# Patient Record
Sex: FEMALE | Race: WHITE | Employment: OTHER | URBAN - METROPOLITAN AREA
[De-identification: names, ages, dates, MRNs, and addresses within clinical notes are randomized per-mention and may not be internally consistent; named-entity substitution may affect disease eponyms.]

---

## 2019-05-04 ENCOUNTER — APPOINTMENT (OUTPATIENT)
Dept: CT IMAGING | Age: 84
End: 2019-05-04
Payer: MEDICARE

## 2019-05-04 ENCOUNTER — HOSPITAL ENCOUNTER (EMERGENCY)
Age: 84
Discharge: HOME | End: 2019-05-04
Attending: EMERGENCY MEDICINE
Payer: MEDICARE

## 2019-05-04 VITALS
WEIGHT: 145 LBS | HEIGHT: 64 IN | RESPIRATION RATE: 16 BRPM | TEMPERATURE: 99 F | SYSTOLIC BLOOD PRESSURE: 153 MMHG | OXYGEN SATURATION: 97 % | BODY MASS INDEX: 24.75 KG/M2 | HEART RATE: 81 BPM | DIASTOLIC BLOOD PRESSURE: 73 MMHG

## 2019-05-04 DIAGNOSIS — S09.90XA INJURY OF HEAD, INITIAL ENCOUNTER: Primary | ICD-10-CM

## 2019-05-04 DIAGNOSIS — S01.01XA LACERATION OF SCALP, INITIAL ENCOUNTER: ICD-10-CM
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
## 2019-05-04 PROCEDURE — 70450 CT HEAD/BRAIN W/O DYE: CPT

## 2019-05-04 PROCEDURE — 99284 EMERGENCY DEPT VISIT MOD MDM: CPT

## 2019-05-04 PROCEDURE — 12001 RPR S/N/AX/GEN/TRNK 2.5CM/<: CPT

## 2019-05-04 PROCEDURE — 72125 CT NECK SPINE W/O DYE: CPT

## 2019-05-04 ASSESSMENT — ENCOUNTER SYMPTOMS
BACK PAIN: 0
VOMITING: 0
CHEST TIGHTNESS: 0
COUGH: 0
SHORTNESS OF BREATH: 0
NAUSEA: 0
BLOOD IN STOOL: 0
CONSTIPATION: 0
ABDOMINAL PAIN: 0
RHINORRHEA: 0
WHEEZING: 0
SORE THROAT: 0
DIARRHEA: 0

## 2019-05-04 NOTE — ED PROVIDER NOTES
well-developed and well-nourished. No distress. HENT:   Head: Normocephalic and atraumatic. Mouth/Throat: Oropharynx is clear and moist. No oropharyngeal exudate. Eyes: Pupils are equal, round, and reactive to light. EOM are normal. Right eye exhibits no discharge. Left eye exhibits no discharge. No scleral icterus. Neck: Normal range of motion. Neck supple. No thyromegaly present. No midline cervical tenderness on palpation. No decreased range of motion. Cardiovascular: Normal rate, regular rhythm and intact distal pulses. Exam reveals no gallop and no friction rub. No murmur heard. Pulmonary/Chest: Effort normal. No stridor. No respiratory distress. She has no wheezes. She has no rales. She exhibits no tenderness. Abdominal: Soft. She exhibits no distension and no mass. There is no tenderness. There is no rebound and no guarding. No hernia. Musculoskeletal: Normal range of motion. She exhibits no edema, tenderness or deformity. Lymphadenopathy:     She has no cervical adenopathy. Neurological: She is alert and oriented to person, place, and time. No cranial nerve deficit or sensory deficit. No focal deficits. No decrease in sensation. Cranial nerves III through XII grossly intact. Skin: Skin is warm and dry. Capillary refill takes less than 2 seconds. No rash noted. She is not diaphoretic. No erythema. No pallor. Wound located posterior to the left ear. Psychiatric: She has a normal mood and affect. Her behavior is normal.   Nursing note and vitals reviewed. Procedures    MDM     Laceration Repair Procedure Note    Indication: Laceration    Procedure: The patient was placed in the appropriate position and anesthesia around the laceration was not needed. The area was then cleansed using saline and debrided. The laceration was closed with Dermabond. There were no additional lacerations requiring repair. The wound area was then dressed with a bandage.   Minimal debridement was preformed, flaps were aligned. No foreign body was identified. Total repaired wound length: 1 cm. Other Items: None    The patient tolerated the procedure well. Complications: None      ED Course as of May 05 0739   Sat May 04, 2019   1058   ATTENDING PROVIDER ATTESTATION:     I have personally performed and/or participated in the history, exam, medical decision making, and procedures and agree with all pertinent clinical information unless otherwise noted. I have also reviewed and agree with the past medical, family and social history unless otherwise noted. I have discussed this patient in detail with the resident and provided the instruction and education regarding the evidence-based evaluation and treatment of [unfilled]  History: patient presents following a mechanical fall. She is on plavix and struck her head. She denies LOC. She states she has no pain and was able to immediately get up and ambulate. My findings: Juany Tse is a 80 y.o. female whom is in no distress. Physical exam reveals small punctate wound with active bleed from the left parietal area with underlying hematoma. PERRL, EOMI, heart RRR, lungs CTA, abdomen is soft and nontender. No extremity deformities or neurologic deficits. GCS 15. My plan: Symptomatic and supportive care. CT head and c-spine. Repair the wound with dermabond. Electronically signed by Deb Cramer DO on 5/4/19 at 10:58 AM          [JS]      ED Course User Index  [JS] Deb Cramer DO       --------------------------------------------- PAST HISTORY ---------------------------------------------  Past Medical History:  has a past medical history of Bladder incontinence, Hyperlipidemia, Osteoarthritis, and Sinus infection. Past Surgical History:  has a past surgical history that includes Appendectomy; Tonsillectomy; Foot surgery; Knee arthroscopy; Hysterectomy; Rotator cuff repair;  Hip fracture surgery; and eye surgery (Left, 11/2016). Social History:  reports that she has never smoked. She has never used smokeless tobacco. She reports that she does not drink alcohol or use drugs. Family History: family history includes Heart Disease in her father and mother; High Blood Pressure in her sister; High Cholesterol in her brother and sister. The patients home medications have been reviewed. Allergies: Zoloft [sertraline hcl]    -------------------------------------------------- RESULTS -------------------------------------------------  Labs:  No results found for this visit on 05/04/19. Radiology:  CT Head WO Contrast   Final Result      1. No acute traumatic injury intracranially. 2. Moderate degree of chronic microvascular ischemic changes of the   white matter. 3. 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 4. Interval resolution of previously seen moderate right sphenoid   sinusitis. CT Cervical Spine WO Contrast   Final Result   Spondylosis. No fracture.          ------------------------- NURSING NOTES AND VITALS REVIEWED ---------------------------  Date / Time Roomed:  5/4/2019 10:33 AM  ED Bed Assignment:  02/02    The nursing notes within the ED encounter and vital signs as below have been reviewed. BP (!) 153/73   Pulse 81   Temp 99 °F (37.2 °C) (Oral)   Resp 16   Ht 5' 4\" (1.626 m)   Wt 145 lb (65.8 kg)   SpO2 97%   BMI 24.89 kg/m²   Oxygen Saturation Interpretation: Normal      ------------------------------------------ PROGRESS NOTES ------------------------------------------  I have spoken with the patient and discussed todays results, in addition to providing specific details for the plan of care and counseling regarding the diagnosis and prognosis. Their questions are answered at this time and they are agreeable with the plan. I discussed at length with them reasons for immediate return here for re evaluation.  They will followup with primary care by calling their office tomorrow. --------------------------------- ADDITIONAL PROVIDER NOTES ---------------------------------  At this time the patient is without objective evidence of an acute process requiring hospitalization or inpatient management. They have remained hemodynamically stable throughout their entire ED visit and are stable for discharge with outpatient follow-up. Discussed imaging with patient. CT head was negative for any acute pathology. Patient had laceration repair. She did not require any pain management. She had no complaints of any pain. CT neck was also negative. Instructed patient to follow up with her primary care provider for reassessment. Told her not to get laceration area wet due to Dermabond for the next couple of days. Strict patient to return the emergency Department if symptoms worsen. Patient agreed with this plan and was discharged back to her care facility. The plan has been discussed in detail and they are aware of the specific conditions for emergent return, as well as the importance of follow-up. Discharge Medication List as of 5/4/2019  1:56 PM          Diagnosis:  1. Injury of head, initial encounter    2. Fall, initial encounter    3. Laceration of scalp, initial encounter        Disposition:  Patient's disposition: Discharge to home  Patient's condition is stable. ED Course as of May 05 0736   Sat May 04, 2019   1058   ATTENDING PROVIDER ATTESTATION:     I have personally performed and/or participated in the history, exam, medical decision making, and procedures and agree with all pertinent clinical information unless otherwise noted. I have also reviewed and agree with the past medical, family and social history unless otherwise noted.     I have discussed this patient in detail with the resident and provided the instruction and education regarding the evidence-based evaluation and treatment of [unfilled]  History: patient presents following a mechanical fall. She is on plavix and struck her head. She denies LOC. She states she has no pain and was able to immediately get up and ambulate. My findings: Ivana Askew is a 80 y.o. female whom is in no distress. Physical exam reveals small punctate wound with active bleed from the left parietal area with underlying hematoma. PERRL, EOMI, heart RRR, lungs CTA, abdomen is soft and nontender. No extremity deformities or neurologic deficits. GCS 15. My plan: Symptomatic and supportive care. CT head and c-spine. Repair the wound with dermabond.     Electronically signed by Melita Byrne DO on 5/4/19 at 10:58 AM          [JS]      ED Course User Index  [GORDON] Melita Byrne, [id number]  Resident  05/05/19 [id number]

## 2019-05-04 NOTE — ED NOTES
Per CT, unable to take patient at this time.  Will come to get patient when ready     Ambar Hopkins RN  05/04/19 3740

## 2021-01-29 ENCOUNTER — HOSPITAL ENCOUNTER (EMERGENCY)
Age: 86
Discharge: HOME | End: 2021-01-30
Attending: EMERGENCY MEDICINE
Payer: MEDICARE

## 2021-01-29 ENCOUNTER — APPOINTMENT (OUTPATIENT)
Dept: GENERAL RADIOLOGY | Age: 86
End: 2021-01-29
Payer: MEDICARE

## 2021-01-29 ENCOUNTER — APPOINTMENT (OUTPATIENT)
Dept: CT IMAGING | Age: 86
End: 2021-01-29
Payer: MEDICARE

## 2021-01-29 VITALS
TEMPERATURE: 97.6 F | HEIGHT: 64 IN | BODY MASS INDEX: 22.02 KG/M2 | SYSTOLIC BLOOD PRESSURE: 143 MMHG | RESPIRATION RATE: 17 BRPM | WEIGHT: 129 LBS | HEART RATE: 88 BPM | DIASTOLIC BLOOD PRESSURE: 79 MMHG | OXYGEN SATURATION: 98 %

## 2021-01-29 DIAGNOSIS — N30.01 ACUTE CYSTITIS WITH HEMATURIA: Primary | ICD-10-CM

## 2021-01-29 LAB
ALBUMIN SERPL-MCNC: 4.3 G/DL (ref 3.5–5.2)
ALP BLD-CCNC: 56 U/L (ref 35–104)
ALT SERPL-CCNC: 14 U/L (ref 0–32)
AMMONIA: 19 UMOL/L (ref 11–51)
AMORPHOUS: ABNORMAL
ANION GAP SERPL CALCULATED.3IONS-SCNC: 13 MMOL/L (ref 7–16)
APTT: 31.1 SEC (ref 24.5–35.1)
AST SERPL-CCNC: 21 U/L (ref 0–31)
BACTERIA: ABNORMAL /HPF
BASOPHILS ABSOLUTE: 0.01 E9/L (ref 0–0.2)
BASOPHILS RELATIVE PERCENT: 0.1 % (ref 0–2)
BILIRUB SERPL-MCNC: 0.4 MG/DL (ref 0–1.2)
BILIRUBIN URINE: NEGATIVE
BLOOD, URINE: ABNORMAL
BUN BLDV-MCNC: 20 MG/DL (ref 8–23)
CALCIUM SERPL-MCNC: 9.4 MG/DL (ref 8.6–10.2)
CHLORIDE BLD-SCNC: 101 MMOL/L (ref 98–107)
CLARITY: ABNORMAL
CO2: 23 MMOL/L (ref 22–29)
COLOR: YELLOW
CREAT SERPL-MCNC: 0.8 MG/DL (ref 0.5–1)
EKG ATRIAL RATE: 93 BPM
EKG Q-T INTERVAL: 330 MS
EKG QRS DURATION: 82 MS
EKG QTC CALCULATION (BAZETT): 410 MS
EKG R AXIS: -37 DEGREES
EKG T AXIS: -2 DEGREES
EKG VENTRICULAR RATE: 93 BPM
EOSINOPHILS ABSOLUTE: 0.17 E9/L (ref 0.05–0.5)
EOSINOPHILS RELATIVE PERCENT: 2.5 % (ref 0–6)
GFR AFRICAN AMERICAN: >60
GFR NON-AFRICAN AMERICAN: >60 ML/MIN/1.73
GLUCOSE BLD-MCNC: 88 MG/DL (ref 74–99)
GLUCOSE URINE: NEGATIVE MG/DL
HCT VFR BLD CALC: 34.6 % (ref 34–48)
HEMOGLOBIN: 11.5 G/DL (ref 11.5–15.5)
IMMATURE GRANULOCYTES #: 0.03 E9/L
IMMATURE GRANULOCYTES %: 0.4 % (ref 0–5)
INR BLD: 1
KETONES, URINE: ABNORMAL MG/DL
LACTIC ACID, SEPSIS: 1.2 MMOL/L (ref 0.5–1.9)
LEUKOCYTE ESTERASE, URINE: ABNORMAL
LYMPHOCYTES ABSOLUTE: 1.79 E9/L (ref 1.5–4)
LYMPHOCYTES RELATIVE PERCENT: 26.3 % (ref 20–42)
MCH RBC QN AUTO: 32.9 PG (ref 26–35)
MCHC RBC AUTO-ENTMCNC: 33.2 % (ref 32–34.5)
MCV RBC AUTO: 98.9 FL (ref 80–99.9)
MONOCYTES ABSOLUTE: 0.7 E9/L (ref 0.1–0.95)
MONOCYTES RELATIVE PERCENT: 10.3 % (ref 2–12)
NEUTROPHILS ABSOLUTE: 4.1 E9/L (ref 1.8–7.3)
NEUTROPHILS RELATIVE PERCENT: 60.4 % (ref 43–80)
NITRITE, URINE: POSITIVE
PDW BLD-RTO: 14 FL (ref 11.5–15)
PH UA: 8.5 (ref 5–9)
PLATELET # BLD: 266 E9/L (ref 130–450)
PMV BLD AUTO: 9.5 FL (ref 7–12)
POTASSIUM REFLEX MAGNESIUM: 4.2 MMOL/L (ref 3.5–5)
PROTEIN UA: NEGATIVE MG/DL
PROTHROMBIN TIME: 11.8 SEC (ref 9.3–12.4)
RBC # BLD: 3.5 E12/L (ref 3.5–5.5)
RBC UA: ABNORMAL /HPF (ref 0–2)
SARS-COV-2, NAAT: NOT DETECTED
SODIUM BLD-SCNC: 137 MMOL/L (ref 132–146)
SPECIFIC GRAVITY UA: 1.02 (ref 1–1.03)
TOTAL PROTEIN: 6.7 G/DL (ref 6.4–8.3)
TROPONIN: <0.01 NG/ML (ref 0–0.03)
UROBILINOGEN, URINE: 2 E.U./DL
WBC # BLD: 6.8 E9/L (ref 4.5–11.5)
WBC UA: ABNORMAL /HPF (ref 0–5)

## 2021-01-29 PROCEDURE — 6360000002 HC RX W HCPCS: Performed by: EMERGENCY MEDICINE

## 2021-01-29 PROCEDURE — 96374 THER/PROPH/DIAG INJ IV PUSH: CPT

## 2021-01-29 PROCEDURE — 84484 ASSAY OF TROPONIN QUANT: CPT

## 2021-01-29 PROCEDURE — 70450 CT HEAD/BRAIN W/O DYE: CPT

## 2021-01-29 PROCEDURE — 36415 COLL VENOUS BLD VENIPUNCTURE: CPT

## 2021-01-29 PROCEDURE — 81001 URINALYSIS AUTO W/SCOPE: CPT

## 2021-01-29 PROCEDURE — 85025 COMPLETE CBC W/AUTO DIFF WBC: CPT

## 2021-01-29 PROCEDURE — 99284 EMERGENCY DEPT VISIT MOD MDM: CPT

## 2021-01-29 PROCEDURE — 71046 X-RAY EXAM CHEST 2 VIEWS: CPT

## 2021-01-29 PROCEDURE — 85730 THROMBOPLASTIN TIME PARTIAL: CPT

## 2021-01-29 PROCEDURE — 93005 ELECTROCARDIOGRAM TRACING: CPT | Performed by: EMERGENCY MEDICINE

## 2021-01-29 PROCEDURE — 87040 BLOOD CULTURE FOR BACTERIA: CPT

## 2021-01-29 PROCEDURE — U0002 COVID-19 LAB TEST NON-CDC: HCPCS

## 2021-01-29 PROCEDURE — 80053 COMPREHEN METABOLIC PANEL: CPT

## 2021-01-29 PROCEDURE — 85610 PROTHROMBIN TIME: CPT

## 2021-01-29 PROCEDURE — 2580000003 HC RX 258: Performed by: EMERGENCY MEDICINE

## 2021-01-29 PROCEDURE — 83605 ASSAY OF LACTIC ACID: CPT

## 2021-01-29 PROCEDURE — 82140 ASSAY OF AMMONIA: CPT

## 2021-01-29 RX ORDER — SODIUM CHLORIDE 0.9 % (FLUSH) 0.9 %
10 SYRINGE (ML) INJECTION EVERY 12 HOURS SCHEDULED
Status: DISCONTINUED | OUTPATIENT
Start: 2021-01-29 | End: 2021-01-30 | Stop reason: HOSPADM

## 2021-01-29 RX ORDER — SODIUM CHLORIDE 0.9 % (FLUSH) 0.9 %
10 SYRINGE (ML) INJECTION PRN
Status: DISCONTINUED | OUTPATIENT
Start: 2021-01-29 | End: 2021-01-30 | Stop reason: HOSPADM

## 2021-01-29 RX ORDER — CEFDINIR 300 MG/1
300 CAPSULE ORAL 2 TIMES DAILY
Qty: 14 CAPSULE | Refills: 0 | Status: SHIPPED | OUTPATIENT
Start: 2021-01-29 | End: 2021-02-05

## 2021-01-29 RX ORDER — 0.9 % SODIUM CHLORIDE 0.9 %
1000 INTRAVENOUS SOLUTION INTRAVENOUS ONCE
Status: COMPLETED | OUTPATIENT
Start: 2021-01-29 | End: 2021-01-29

## 2021-01-29 RX ADMIN — SODIUM CHLORIDE 1000 ML: 9 INJECTION, SOLUTION INTRAVENOUS at 16:44

## 2021-01-29 RX ADMIN — SODIUM CHLORIDE, PRESERVATIVE FREE 10 ML: 5 INJECTION INTRAVENOUS at 20:47

## 2021-01-29 RX ADMIN — CEFTRIAXONE SODIUM 1000 MG: 1 INJECTION, POWDER, FOR SOLUTION INTRAMUSCULAR; INTRAVENOUS at 20:46

## 2021-01-29 ASSESSMENT — ENCOUNTER SYMPTOMS: TACHYPNEA: 1

## 2021-01-29 NOTE — ED NOTES
ekg done and given to dr Reggy Lennox, pt placed on cardiac monitor with cont pulse ox, bed low/locked with side rails up and call light within reach     Luís Schofield RN  01/29/21 [id number]

## 2021-01-30 NOTE — ED NOTES
Walked pt to bathroom to obtain urine. Pt stated that she was unable to void at this time. Changed pt pull- up which was wet. Attempted to obtain urine via straight cath. Pt refusing straight cath and would not cooperate with procedure.  notified and came and spoke with pt at bedside. Attempted to straight cath Pt again, but pt would not cooperate. Unable to obtain urine at this time.      Jack Kelley RN  01/29/21 2004

## 2021-01-30 NOTE — ED PROVIDER NOTES
ELI Negron is a 80 y.o. female with a PMHx significant for HLD, dementia, OH and a recently diagnosed UTI who presents from her NH with concerns for worsening AMS. On arrival, the patient is unable to provide any significant past medical history 2/2 to her baseline dementia. She was in NAD on initial evaluation. Report from the NH is that the patient was diagnosed with a UTI two days ago, but no urine was ever collected. She was started on Bactrim at that time and her nurses were concerned that she was getting worse. When asked why the patient was presumed to be more confused than normal, no reason was able to be given. The patient is currently taking Plavix. Tobacco Hx:   reports that she has never smoked. She has never used smokeless tobacco.    Alcohol Hx:   reports no history of alcohol use. Illicit Drug Hx:  No reported hx of illicit drug use. The history is provided by EMS and obtained via chart review. The patient is unable to provide any meaningful history 2/2 their condition. History is also obtained from the patient's NH. Last Tetanus (if applicable): N/A    Review of Systems   Unable to perform ROS: Dementia        Physical Exam  Vitals signs and nursing note reviewed. Constitutional:       General: She is awake. She is not in acute distress. Appearance: She is not diaphoretic. Comments: Patient is a confused elderly female in NAD. HENT:      Head: Normocephalic and atraumatic. Right Ear: External ear normal.      Left Ear: External ear normal.      Nose: Nose normal.      Mouth/Throat:      Mouth: Mucous membranes are moist.      Pharynx: Oropharynx is clear. Eyes:      General: No scleral icterus. Right eye: No discharge. Left eye: No discharge. Extraocular Movements: Extraocular movements intact.       Conjunctiva/sclera: Conjunctivae normal.   Neck: Musculoskeletal: Normal range of motion and neck supple. No neck rigidity or muscular tenderness. Cardiovascular:      Rate and Rhythm: Normal rate and regular rhythm. Heart sounds: Normal heart sounds. No murmur. No friction rub. No gallop. Comments: Upper extremity and lower extremity distal pulses intact bilaterally +2/4  Pulmonary:      Effort: Pulmonary effort is normal. No respiratory distress. Breath sounds: Normal breath sounds. No wheezing, rhonchi or rales. Comments: Good air movement noted throughout. Chest:      Chest wall: No tenderness. Abdominal:      General: Bowel sounds are normal. There is no distension. Palpations: Abdomen is soft. Tenderness: There is no abdominal tenderness. There is no guarding or rebound. Musculoskeletal: Normal range of motion. General: No tenderness or deformity. Right lower leg: No edema. Left lower leg: No edema. Lymphadenopathy:      Cervical: No cervical adenopathy. Skin:     General: Skin is warm and dry. Capillary Refill: Capillary refill takes less than 2 seconds. Findings: No erythema or rash. Neurological:      General: No focal deficit present. Mental Status: She is alert. Mental status is at baseline. Sensory: No sensory deficit. Motor: No weakness. Coordination: Coordination normal.      Comments: Patient is A&O to self only which seems to be her baseline.        ---------------------------------- PAST HISTORY ---------------------------------------------  Past Medical History:  has a past medical history of Bladder incontinence, Hyperlipidemia, Osteoarthritis, and Sinus infection. Past Surgical History:  has a past surgical history that includes Appendectomy; Tonsillectomy; Foot surgery; Knee arthroscopy; Hysterectomy; Rotator cuff repair; Hip fracture surgery; and eye surgery (Left, 11/2016). Social History:  reports that she has never smoked. She has never used smokeless tobacco. She reports that she does not drink alcohol or use drugs. Family History: family history includes Heart Disease in her father and mother; High Blood Pressure in her sister; High Cholesterol in her brother and sister. Home Meds: Not in a hospital admission. The patients home medications have been reviewed. Allergies: Zoloft [sertraline hcl]    ------------------------- NURSING NOTES AND VITALS REVIEWED ---------------------------  Date / Time Roomed:  1/29/2021  2:57 PM  ED Bed Assignment:  12/12    The nursing notes within the ED encounter and vital signs as below have been reviewed. BP (!) 143/79   Pulse 88   Temp 97.6 °F (36.4 °C) (Oral)   Resp 17   Ht 5' 4\" (1.626 m)   Wt 129 lb (58.5 kg)   SpO2 98%   BMI 22.14 kg/m²   -------------------------------------------------- RESULTS / INTERVENTIONS -------------------------------------------------  All laboratory and radiology tests have been reviewed by this physician.     LABS:  Results for orders placed or performed during the hospital encounter of 01/29/21   CBC Auto Differential   Result Value Ref Range    WBC 6.8 4.5 - 11.5 E9/L    RBC 3.50 3.50 - 5.50 E12/L    Hemoglobin 11.5 11.5 - 15.5 g/dL    Hematocrit 34.6 34.0 - 48.0 %    MCV 98.9 80.0 - 99.9 fL    MCH 32.9 26.0 - 35.0 pg    MCHC 33.2 32.0 - 34.5 %    RDW 14.0 11.5 - 15.0 fL    Platelets 376 203 - 079 E9/L    MPV 9.5 7.0 - 12.0 fL    Neutrophils % 60.4 43.0 - 80.0 %    Immature Granulocytes % 0.4 0.0 - 5.0 %    Lymphocytes % 26.3 20.0 - 42.0 %    Monocytes % 10.3 2.0 - 12.0 %    Eosinophils % 2.5 0.0 - 6.0 %    Basophils % 0.1 0.0 - 2.0 %    Neutrophils Absolute 4.10 1.80 - 7.30 E9/L    Immature Granulocytes # 0.03 E9/L    Lymphocytes Absolute 1.79 1.50 - 4.00 E9/L    Monocytes Absolute 0.70 0.10 - 0.95 E9/L    Eosinophils Absolute 0.17 0.05 - 0.50 E9/L    Basophils Absolute 0.01 0.00 - 0.20 E9/L Comprehensive Metabolic Panel w/ Reflex to MG   Result Value Ref Range    Sodium 137 132 - 146 mmol/L    Potassium reflex Magnesium 4.2 3.5 - 5.0 mmol/L    Chloride 101 98 - 107 mmol/L    CO2 23 22 - 29 mmol/L    Anion Gap 13 7 - 16 mmol/L    Glucose 88 74 - 99 mg/dL    BUN 20 8 - 23 mg/dL    CREATININE 0.8 0.5 - 1.0 mg/dL    GFR Non-African American >60 >=60 mL/min/1.73    GFR African American >60     Calcium 9.4 8.6 - 10.2 mg/dL    Total Protein 6.7 6.4 - 8.3 g/dL    Albumin 4.3 3.5 - 5.2 g/dL    Total Bilirubin 0.4 0.0 - 1.2 mg/dL    Alkaline Phosphatase 56 35 - 104 U/L    ALT 14 0 - 32 U/L    AST 21 0 - 31 U/L   Ammonia   Result Value Ref Range    Ammonia 19.0 11.0 - 51.0 umol/L   Troponin   Result Value Ref Range    Troponin <0.01 0.00 - 0.03 ng/mL   Protime-INR   Result Value Ref Range    Protime 11.8 9.3 - 12.4 sec    INR 1.0    APTT   Result Value Ref Range    aPTT 31.1 24.5 - 35.1 sec   Lactate, Sepsis   Result Value Ref Range    Lactic Acid, Sepsis 1.2 0.5 - 1.9 mmol/L   Urinalysis, reflex to microscopic   Result Value Ref Range    Color, UA Yellow Straw/Yellow    Clarity, UA SLCLOUDY Clear    Glucose, Ur Negative Negative mg/dL    Bilirubin Urine Negative Negative    Ketones, Urine TRACE (A) Negative mg/dL    Specific Gravity, UA 1.020 1.005 - 1.030    Blood, Urine MODERATE (A) Negative    pH, UA 8.5 5.0 - 9.0    Protein, UA Negative Negative mg/dL    Urobilinogen, Urine 2.0 (A) <2.0 E.U./dL    Nitrite, Urine POSITIVE (A) Negative    Leukocyte Esterase, Urine TRACE (A) Negative   COVID-19   Result Value Ref Range    SARS-CoV-2, NAAT Not Detected Not Detected   Microscopic Urinalysis   Result Value Ref Range    WBC, UA 1-3 0 - 5 /HPF    RBC, UA 5-10 (A) 0 - 2 /HPF    Bacteria, UA MODERATE (A) None Seen /HPF    Amorphous, UA FEW    EKG 12 Lead   Result Value Ref Range    Ventricular Rate 93 BPM    Atrial Rate 93 BPM    QRS Duration 82 ms    Q-T Interval 330 ms    QTc Calculation (Bazett) 410 ms R Axis -37 degrees    T Axis -2 degrees       RADIOLOGY: Interpreted by Radiologist unless otherwise noted. XR CHEST (2 VW)   Final Result   No pneumonia or pleural effusion. CT Head WO Contrast   Final Result   1. No intracranial hemorrhage or acute intracranial disease identified. 2.  Small vessel chronic ischemic change, moderate to extensive in degree. Remote left thalamic lacunar infarct. EKG: As interpreted by this ER physician. Rate: 93 bpm  Rhythm: Sinus with 1st degree AV Block  Axis: left  ST Segments: no acute change  T-Waves: no acute change  Interpretation: Sinus with 1st degree AV block; abnormal EKG  Comparison: no previous EKG    Oxygen Saturation Interpretation: Normal    Meds Given:  Medications   sodium chloride flush 0.9 % injection 10 mL (has no administration in time range)   sodium chloride flush 0.9 % injection 10 mL (10 mLs Intravenous Given 1/29/21 2047)   0.9 % sodium chloride bolus (0 mLs Intravenous Stopped 1/29/21 2103)   cefTRIAXone (ROCEPHIN) 1,000 mg in sterile water 10 mL IV syringe (0 mg Intravenous Stopped 1/29/21 2054)       Procedures:  No procedures performed. --------------------------------- PROGRESS NOTES / ADDITIONAL PROVIDER NOTES ---------------------------------  Consultations:  As outlined below. ED Course:    ED Course as of Jan 29 2232   Fri Jan 29, 2021 2047 On reevaluation, the patient remains significantly altered. Despite the nursing home was statement that this patient is significantly off of her baseline, he did not have any evidence of that. She remains interactive and noncombative when we are not trying to straight catheter for urine. Her UA was positive for urinary tract infection. The patient will be given a dose of Rocephin. I will send her back to her nursing home with a prescription for cefdinir. After the patient receives her antibiotic, she will be considered stable and safe for discharge.     [ML] ED Course User Index  [ML] Paula Hurley,        2053: All results were listed in the patient's paperwork and I have provided specific details regarding the plan of care, diagnosis and associated prognosis. The patient tolerated the visit well and, at the time of discharge, she was without objective evidence of hemodynamic instability or an acute process (biological or psychological) requiring hospitalization and inpatient management. She was seen by myself and the assigned attending physician, Dr. Slade Allison, who agreed with my assessment and plan as laid out herein. The importance of follow-up was discussed at the end of the visit and I recommended that the patient be seen by her primary care physician in 2-3 days. Reasons to return to the ER or seek immediate evaluation by a medical provider were also provided. The patient was discharged to the NH in stable condition. MDM:  Patient presented from her NH with reports of a UTI and worsening AMS. On arrival, the patient was hypertensive with remaining VS wnl. EKG and physical exam were as documented above. A work up was initiated to further evaluate her presenting complaints. Troponin was normal. No leukocytosis and lactate wnl. Ammonia normal. COVID negative. UA was indicative of a UTI. CT of the head showed chronic changes, but did not reveal any acute intracranial pathology. CXR was negative for obvious cardiopulmonary pathology. Patient was clearly demented, but did not seem worse than her reported baseline. She was resistant to providing a urine sample, but one was able to be collected via straight cath. Given the lack of acute findings requiring emergent intervention or hospitalization, the patient was diagnosed with a UTI, started on appropriate antibiotics and discharged back to her NH with a prescription and follow up recs. She was stable at the time of her disposition.       New Prescriptions

## 2021-01-30 NOTE — ED NOTES
0010 Hrs - Physicians original eta  [id number] hrs - Physicians 2nd eta     Erika Armijo  01/30/21 0033

## 2021-02-03 LAB
BLOOD CULTURE, ROUTINE: NORMAL
CULTURE, BLOOD 2: NORMAL

## 2021-09-16 ENCOUNTER — APPOINTMENT (OUTPATIENT)
Dept: CT IMAGING | Age: 86
DRG: 689 | End: 2021-09-16
Payer: MEDICARE

## 2021-09-16 ENCOUNTER — APPOINTMENT (OUTPATIENT)
Dept: GENERAL RADIOLOGY | Age: 86
DRG: 689 | End: 2021-09-16
Payer: MEDICARE

## 2021-09-16 ENCOUNTER — HOSPITAL ENCOUNTER (INPATIENT)
Age: 86
LOS: 7 days | Discharge: NURSING, REHAB OR LONG-TERM CARE | DRG: 689 | End: 2021-09-23
Attending: EMERGENCY MEDICINE | Admitting: FAMILY MEDICINE
Payer: MEDICARE

## 2021-09-16 DIAGNOSIS — S32.10XA CLOSED FRACTURE OF SACRUM, UNSPECIFIED PORTION OF SACRUM, INITIAL ENCOUNTER (HCC): ICD-10-CM

## 2021-09-16 DIAGNOSIS — S32.591A PUBIC RAMUS FRACTURE, RIGHT, CLOSED, INITIAL ENCOUNTER (HCC): ICD-10-CM

## 2021-09-16 DIAGNOSIS — S32.591A CLOSED FRACTURE OF RAMUS OF RIGHT PUBIS, INITIAL ENCOUNTER (HCC): Primary | ICD-10-CM

## 2021-09-16 DIAGNOSIS — S32.009A CLOSED FRACTURE OF TRANSVERSE PROCESS OF LUMBAR VERTEBRA, INITIAL ENCOUNTER (HCC): ICD-10-CM

## 2021-09-16 PROCEDURE — 72192 CT PELVIS W/O DYE: CPT

## 2021-09-16 PROCEDURE — 6360000002 HC RX W HCPCS: Performed by: EMERGENCY MEDICINE

## 2021-09-16 PROCEDURE — 99284 EMERGENCY DEPT VISIT MOD MDM: CPT

## 2021-09-16 PROCEDURE — 1200000000 HC SEMI PRIVATE

## 2021-09-16 PROCEDURE — 70450 CT HEAD/BRAIN W/O DYE: CPT

## 2021-09-16 PROCEDURE — 96374 THER/PROPH/DIAG INJ IV PUSH: CPT

## 2021-09-16 PROCEDURE — 72170 X-RAY EXAM OF PELVIS: CPT

## 2021-09-16 RX ORDER — CARBOXYMETHYLCELLULOSE SODIUM 5 MG/ML
1 SOLUTION/ DROPS OPHTHALMIC PRN
Status: DISCONTINUED | OUTPATIENT
Start: 2021-09-16 | End: 2021-09-17 | Stop reason: CLARIF

## 2021-09-16 RX ORDER — CETIRIZINE HYDROCHLORIDE 10 MG/1
10 TABLET ORAL DAILY
Status: DISCONTINUED | OUTPATIENT
Start: 2021-09-17 | End: 2021-09-24 | Stop reason: HOSPADM

## 2021-09-16 RX ORDER — ACETAMINOPHEN 325 MG/1
650 TABLET ORAL EVERY 4 HOURS PRN
Status: DISCONTINUED | OUTPATIENT
Start: 2021-09-16 | End: 2021-09-24 | Stop reason: HOSPADM

## 2021-09-16 RX ORDER — SODIUM CHLORIDE 0.9 % (FLUSH) 0.9 %
5-40 SYRINGE (ML) INJECTION EVERY 12 HOURS SCHEDULED
Status: DISCONTINUED | OUTPATIENT
Start: 2021-09-17 | End: 2021-09-16 | Stop reason: SDUPTHER

## 2021-09-16 RX ORDER — MECOBALAMIN 5000 MCG
5 TABLET,DISINTEGRATING ORAL NIGHTLY
Status: DISCONTINUED | OUTPATIENT
Start: 2021-09-16 | End: 2021-09-24 | Stop reason: HOSPADM

## 2021-09-16 RX ORDER — CARBOXYMETHYLCELLULOSE SODIUM 5 MG/ML
1 SOLUTION/ DROPS OPHTHALMIC PRN
Status: ON HOLD | COMMUNITY
End: 2021-09-23 | Stop reason: HOSPADM

## 2021-09-16 RX ORDER — ONDANSETRON 2 MG/ML
4 INJECTION INTRAMUSCULAR; INTRAVENOUS EVERY 6 HOURS PRN
Status: DISCONTINUED | OUTPATIENT
Start: 2021-09-16 | End: 2021-09-16 | Stop reason: SDUPTHER

## 2021-09-16 RX ORDER — SODIUM CHLORIDE 0.9 % (FLUSH) 0.9 %
5-40 SYRINGE (ML) INJECTION PRN
Status: DISCONTINUED | OUTPATIENT
Start: 2021-09-16 | End: 2021-09-16 | Stop reason: SDUPTHER

## 2021-09-16 RX ORDER — SODIUM CHLORIDE 9 MG/ML
25 INJECTION, SOLUTION INTRAVENOUS PRN
Status: DISCONTINUED | OUTPATIENT
Start: 2021-09-16 | End: 2021-09-16 | Stop reason: SDUPTHER

## 2021-09-16 RX ORDER — BISACODYL 10 MG
10 SUPPOSITORY, RECTAL RECTAL DAILY
Status: DISCONTINUED | OUTPATIENT
Start: 2021-09-17 | End: 2021-09-24 | Stop reason: HOSPADM

## 2021-09-16 RX ORDER — POLYETHYLENE GLYCOL 3350 17 G/17G
17 POWDER, FOR SOLUTION ORAL DAILY PRN
Status: ON HOLD | COMMUNITY
End: 2021-09-23 | Stop reason: HOSPADM

## 2021-09-16 RX ORDER — SODIUM CHLORIDE 0.9 % (FLUSH) 0.9 %
5-40 SYRINGE (ML) INJECTION EVERY 12 HOURS SCHEDULED
Status: DISCONTINUED | OUTPATIENT
Start: 2021-09-16 | End: 2021-09-24 | Stop reason: HOSPADM

## 2021-09-16 RX ORDER — CIPROFLOXACIN 250 MG/1
250 TABLET, FILM COATED ORAL 2 TIMES DAILY
Status: ON HOLD | COMMUNITY
End: 2021-09-23 | Stop reason: HOSPADM

## 2021-09-16 RX ORDER — ONDANSETRON 4 MG/1
4 TABLET, ORALLY DISINTEGRATING ORAL EVERY 8 HOURS PRN
Status: DISCONTINUED | OUTPATIENT
Start: 2021-09-16 | End: 2021-09-24 | Stop reason: HOSPADM

## 2021-09-16 RX ORDER — CLOPIDOGREL BISULFATE 75 MG/1
75 TABLET ORAL EVERY OTHER DAY
Status: DISCONTINUED | OUTPATIENT
Start: 2021-09-17 | End: 2021-09-24 | Stop reason: HOSPADM

## 2021-09-16 RX ORDER — ONDANSETRON 4 MG/1
4 TABLET, ORALLY DISINTEGRATING ORAL EVERY 8 HOURS PRN
Status: DISCONTINUED | OUTPATIENT
Start: 2021-09-16 | End: 2021-09-16 | Stop reason: SDUPTHER

## 2021-09-16 RX ORDER — ESCITALOPRAM OXALATE 20 MG/1
20 TABLET ORAL DAILY
COMMUNITY

## 2021-09-16 RX ORDER — MORPHINE SULFATE 4 MG/ML
4 INJECTION, SOLUTION INTRAMUSCULAR; INTRAVENOUS
Status: DISCONTINUED | OUTPATIENT
Start: 2021-09-16 | End: 2021-09-24 | Stop reason: HOSPADM

## 2021-09-16 RX ORDER — HALOPERIDOL 0.5 MG/1
0.5 TABLET ORAL DAILY
Status: DISCONTINUED | OUTPATIENT
Start: 2021-09-17 | End: 2021-09-24 | Stop reason: HOSPADM

## 2021-09-16 RX ORDER — HALOPERIDOL 0.5 MG/1
0.5 TABLET ORAL DAILY
Status: ON HOLD | COMMUNITY
End: 2021-09-23 | Stop reason: HOSPADM

## 2021-09-16 RX ORDER — SODIUM PHOSPHATE,MONO-DIBASIC 19G-7G/118
1 ENEMA (ML) RECTAL DAILY PRN
Status: DISCONTINUED | OUTPATIENT
Start: 2021-09-16 | End: 2021-09-24 | Stop reason: HOSPADM

## 2021-09-16 RX ORDER — MORPHINE SULFATE 4 MG/ML
4 INJECTION, SOLUTION INTRAMUSCULAR; INTRAVENOUS ONCE
Status: COMPLETED | OUTPATIENT
Start: 2021-09-16 | End: 2021-09-16

## 2021-09-16 RX ORDER — PANTOPRAZOLE SODIUM 40 MG/1
40 TABLET, DELAYED RELEASE ORAL DAILY
Status: DISCONTINUED | OUTPATIENT
Start: 2021-09-17 | End: 2021-09-24 | Stop reason: HOSPADM

## 2021-09-16 RX ORDER — CHOLECALCIFEROL (VITAMIN D3) 125 MCG
5 CAPSULE ORAL NIGHTLY
Status: ON HOLD | COMMUNITY
End: 2021-09-23 | Stop reason: HOSPADM

## 2021-09-16 RX ORDER — ESCITALOPRAM OXALATE 10 MG/1
20 TABLET ORAL DAILY
Status: DISCONTINUED | OUTPATIENT
Start: 2021-09-17 | End: 2021-09-24 | Stop reason: HOSPADM

## 2021-09-16 RX ORDER — ONDANSETRON 2 MG/ML
4 INJECTION INTRAMUSCULAR; INTRAVENOUS EVERY 6 HOURS PRN
Status: DISCONTINUED | OUTPATIENT
Start: 2021-09-16 | End: 2021-09-24 | Stop reason: HOSPADM

## 2021-09-16 RX ORDER — SULFAMETHOXAZOLE AND TRIMETHOPRIM 800; 160 MG/1; MG/1
1 TABLET ORAL 2 TIMES DAILY
COMMUNITY

## 2021-09-16 RX ORDER — MORPHINE SULFATE 2 MG/ML
2 INJECTION, SOLUTION INTRAMUSCULAR; INTRAVENOUS
Status: DISCONTINUED | OUTPATIENT
Start: 2021-09-16 | End: 2021-09-24 | Stop reason: HOSPADM

## 2021-09-16 RX ORDER — SODIUM CHLORIDE 0.9 % (FLUSH) 0.9 %
5-40 SYRINGE (ML) INJECTION PRN
Status: DISCONTINUED | OUTPATIENT
Start: 2021-09-16 | End: 2021-09-24 | Stop reason: HOSPADM

## 2021-09-16 RX ORDER — TROLAMINE SALICYLATE 10 G/100G
1 CREAM TOPICAL PRN
Status: ON HOLD | COMMUNITY
End: 2021-09-23 | Stop reason: HOSPADM

## 2021-09-16 RX ORDER — ACETAMINOPHEN 500 MG
1000 TABLET ORAL EVERY 6 HOURS PRN
COMMUNITY

## 2021-09-16 RX ORDER — POLYETHYLENE GLYCOL 3350 17 G/17G
17 POWDER, FOR SOLUTION ORAL DAILY
Status: DISCONTINUED | OUTPATIENT
Start: 2021-09-17 | End: 2021-09-17

## 2021-09-16 RX ORDER — LORATADINE 10 MG/1
10 TABLET ORAL DAILY PRN
COMMUNITY

## 2021-09-16 RX ORDER — PANTOPRAZOLE SODIUM 40 MG/1
40 TABLET, DELAYED RELEASE ORAL DAILY
Status: ON HOLD | COMMUNITY
End: 2021-09-23 | Stop reason: HOSPADM

## 2021-09-16 RX ORDER — SODIUM CHLORIDE 9 MG/ML
25 INJECTION, SOLUTION INTRAVENOUS PRN
Status: DISCONTINUED | OUTPATIENT
Start: 2021-09-16 | End: 2021-09-24 | Stop reason: HOSPADM

## 2021-09-16 RX ADMIN — MORPHINE SULFATE 4 MG: 4 INJECTION, SOLUTION INTRAMUSCULAR; INTRAVENOUS at 19:43

## 2021-09-16 ASSESSMENT — PAIN SCALES - GENERAL: PAINLEVEL_OUTOF10: 8

## 2021-09-16 NOTE — ED PROVIDER NOTES
Chief complaint:  Fall    HPI history provided by report  Patient presents here with a history of psychiatric illness and dementia from a nursing home at her reported oriented, neurologic baseline. Here for a fall, apparently had a mechanical fall with no loss of consciousness and no seizure-like activity and at some point complained of some leg pain to somebody. No other reported complaints or issues. Patient herself at this time is denying pain anywhere. Patient is pleasant confused and really offers no other history or review of systems. Review of Systems   Unable to perform ROS: Dementia        Physical Exam  Vitals and nursing note reviewed. Constitutional:       General: She is awake. She is not in acute distress. Appearance: She is well-developed. She is not ill-appearing, toxic-appearing or diaphoretic. HENT:      Head: Normocephalic and atraumatic. Comments: No sign of acute head or face injuries  Eyes:      Pupils: Pupils are equal, round, and reactive to light. Cardiovascular:      Rate and Rhythm: Normal rate and regular rhythm. Heart sounds: Normal heart sounds. No murmur heard. Pulmonary:      Effort: Pulmonary effort is normal. No respiratory distress. Breath sounds: Normal breath sounds. No stridor, decreased air movement or transmitted upper airway sounds. No decreased breath sounds, wheezing, rhonchi or rales. Chest:      Chest wall: No tenderness. Abdominal:      General: Bowel sounds are normal. There is no distension. There are no signs of injury. Palpations: Abdomen is soft. Tenderness: There is no abdominal tenderness. There is no right CVA tenderness, left CVA tenderness, guarding or rebound. Musculoskeletal:         General: No swelling, tenderness, deformity or signs of injury. Cervical back: Normal range of motion and neck supple. No signs of trauma or rigidity.  No pain with movement, spinous process tenderness or muscular tenderness. Normal range of motion. Right lower leg: No edema. Left lower leg: No edema. Comments: No midline cervical, thoracic or lumbar spine tenderness on palpation. No point bony tenderness, no step-off or deformities. Patient sitting up in the bed essentially moving around on her own with near full range of motion spontaneously. Arms legs are neurovascular intact and palpated throughout their entirety showing no signs of acute bony or joint injury at this time. No hip tenderness, pelvis stable. Negative logroll. No sternal, clavicular chest wall tenderness. Skin:     General: Skin is warm and dry. Coloration: Skin is not jaundiced or pale. Findings: No erythema or rash. Neurological:      General: No focal deficit present. Mental Status: She is alert. Cranial Nerves: No cranial nerve deficit. Coordination: Coordination normal.      Comments: Oriented to person only. Patient otherwise with no focal neurologic deficits and is pleasantly awake and alert and conversant. Procedures     MDM     ED Course as of Sep 16 1957   Thu Sep 16, 2021   1641 Patient sitting up in the bed in no acute distress talking with her sitter, exam is unchanged. No new complaints. [NC]   1853 Case discussed with Dr. Jacquie Hedrick, detailed overview given, he will admit the patient with consults to trauma surgery and Dr. Regan Dickinson of orthopedics. [NC]   1926 Case discussed with Dr. Glenn Harding, detailed review given, he will consult on the patient. [NC]   1952 Case discussed with Dr. Regan Dickinson, detailed overview given, he will consult on the patient. [NC]      ED Course User Index  [NC] Yanira Marcos,         ED Course as of Sep 16 1957   Thu Sep 16, 2021   1641 Patient sitting up in the bed in no acute distress talking with her sitter, exam is unchanged. No new complaints.     [NC]   [id number] Case discussed with Dr. Jacquie Hedrick, detailed overview given, he will admit the patient with consults to trauma surgery and Dr. Lakhwinder Harris of orthopedics. [NC]   1926 Case discussed with Dr. Charisse Cross, detailed review given, he will consult on the patient. [NC]   1952 Case discussed with Dr. Lakhwinder Harris, detailed overview given, he will consult on the patient. [NC]      ED Course User Index  [NC] Susana Elder, DO       --------------------------------------------- PAST HISTORY ---------------------------------------------  Past Medical History:  has a past medical history of Anxiety, Bladder incontinence, Hyperlipidemia, Osteoarthritis, and Sinus infection. Past Surgical History:  has a past surgical history that includes Appendectomy; Tonsillectomy; Foot surgery; Knee arthroscopy; Hysterectomy; Rotator cuff repair; Hip fracture surgery; and eye surgery (Left, 11/2016). Social History:  reports that she has never smoked. She has never used smokeless tobacco. She reports that she does not drink alcohol and does not use drugs. Family History: family history includes Heart Disease in her father and mother; High Blood Pressure in her sister; High Cholesterol in her brother and sister. The patients home medications have been reviewed. Allergies: Zoloft [sertraline hcl]    -------------------------------------------------- RESULTS -------------------------------------------------    LABS:  No results found for this visit on 09/16/21. RADIOLOGY:  CT PELVIS WO CONTRAST Additional Contrast? None   Final Result   1. Comminuted fractures at the right parasymphyseal pubis as well as the   superior and inferior pubic rami. 2. Longitudinal fracture involving the right sacral ala. 3. Minimally displaced fracture at the right L5 transverse process. XR PELVIS (1-2 VIEWS)   Final Result   Comminuted right superior pubic ramus fracture. CT HEAD WO CONTRAST   Final Result   1. There is no acute intracranial abnormality. Specifically, there is no   intracranial hemorrhage. 2. Advanced atrophy and periventricular leukomalacia,               ------------------------- NURSING NOTES AND VITALS REVIEWED ---------------------------  Date / Time Roomed:  9/16/2021  3:16 PM  ED Bed Assignment:  [place] [id number]    The nursing notes within the ED encounter and vital signs as below have been reviewed. Patient Vitals for the past 24 hrs:   BP Temp Temp src Pulse Resp SpO2   09/16/21 1529 (!) 141/66 98.2 °F (36.8 °C) Oral 75 18 98 %       Oxygen Saturation Interpretation: Normal    ------------------------------------------ PROGRESS NOTES ------------------------------------------  Re-evaluation(s):  Time: 1940  Patients symptoms show no change  Repeat physical examination is not changed        --------------------------------- ADDITIONAL PROVIDER NOTES ---------------------------------    This patient's ED course included: a personal history and physicial examination, re-evaluation prior to disposition and multiple bedside re-evaluations    This patient has remained hemodynamically stable during their ED course. Diagnosis:  1. Closed fracture of ramus of right pubis, initial encounter ([facility] [address]    2. Pubic ramus fracture, right, closed, initial encounter ([facility] [address]    3. Closed fracture of sacrum, unspecified portion of sacrum, initial encounter ([facility] [address]    4. Closed fracture of transverse process of lumbar vertebra, initial encounter ([facility] [address]        Disposition:  Patient's disposition: Admit to med/surg floor  Patient's condition is stable.          Virgilio Springer DO  09/16/21 1958

## 2021-09-16 NOTE — ED NOTES
Bed: H2  Expected date:   Expected time:   Means of arrival:   Comments:  kelly Kwan, IAIN  09/16/21 1538

## 2021-09-16 NOTE — LETTER
[place] Department Medicaid  CERTIFICATION OF NECESSITY  FOR NON-EMERGENCY TRANSPORTATION   BY GROUND AMBULANCE      Individual Information   1. Name: Maddi Bryson 2. [place] Medicaid Billing Number: ***   3. Address: [address] [place] [address] Number [id number] [facility] [phone]      Transportation Provider Information   4. Provider Name: Physicians   5. [place] Medicaid Provider Number: *** National Provider Identifier (NPI): ***     Certification  7. Criteria:  During transport, this individual requires:  [x] Medical treatment or continuous     supervision by an EMT. [] The administration or regulation of oxygen by another person. [] Supervised protective restraint. 8. Period Beginning Date: 9/23/2021   9. Length  [x] Not more than 1 day(s)  [] One Year     Additional Information Relevant to Certification   10. Comments or Explanations, If Necessary or Appropriate   Altered mental status, DNRCC, generalized weakness, high falls risk     Certifying Practitioner Information   11. Name of Practitioner: Dr. Inga Mora   12. [place] Medicaid Provider Number, If Applicable: *** 13. National Provider Identifier (NPI): ***     Signature Information   14. Date of Signature: 9/23/2021 15. Name of Person Signing: Leah Marquez RN   16.  Signature and Professional Designation: Electronically signed by Amanda Rosenthal RN on 9/23/2021 at 4:06 PM     OD 10602  Rev. 7/2015

## 2021-09-16 NOTE — ED NOTES
Pt does not remember falling due to her dementia.   Has no complaints of pain anywhere     Cedric Denise RN  09/16/21 0714

## 2021-09-17 ENCOUNTER — APPOINTMENT (OUTPATIENT)
Dept: GENERAL RADIOLOGY | Age: 86
DRG: 689 | End: 2021-09-17
Payer: MEDICARE

## 2021-09-17 ENCOUNTER — APPOINTMENT (OUTPATIENT)
Dept: CT IMAGING | Age: 86
DRG: 689 | End: 2021-09-17
Payer: MEDICARE

## 2021-09-17 LAB
ALBUMIN SERPL-MCNC: 3.7 G/DL (ref 3.5–5.2)
ALP BLD-CCNC: 62 U/L (ref 35–104)
ALT SERPL-CCNC: 12 U/L (ref 0–32)
ANION GAP SERPL CALCULATED.3IONS-SCNC: 10 MMOL/L (ref 7–16)
ANION GAP SERPL CALCULATED.3IONS-SCNC: 7 MMOL/L (ref 7–16)
AST SERPL-CCNC: 16 U/L (ref 0–31)
BACTERIA: ABNORMAL /HPF
BASOPHILS ABSOLUTE: 0.01 E9/L (ref 0–0.2)
BASOPHILS ABSOLUTE: 0.01 E9/L (ref 0–0.2)
BASOPHILS RELATIVE PERCENT: 0.1 % (ref 0–2)
BASOPHILS RELATIVE PERCENT: 0.2 % (ref 0–2)
BILIRUB SERPL-MCNC: 0.3 MG/DL (ref 0–1.2)
BILIRUBIN URINE: NEGATIVE
BLOOD, URINE: ABNORMAL
BUN BLDV-MCNC: 18 MG/DL (ref 6–23)
BUN BLDV-MCNC: 24 MG/DL (ref 6–23)
CALCIUM SERPL-MCNC: 8.8 MG/DL (ref 8.6–10.2)
CALCIUM SERPL-MCNC: 8.9 MG/DL (ref 8.6–10.2)
CHLORIDE BLD-SCNC: 101 MMOL/L (ref 98–107)
CHLORIDE BLD-SCNC: 102 MMOL/L (ref 98–107)
CLARITY: ABNORMAL
CO2: 24 MMOL/L (ref 22–29)
CO2: 25 MMOL/L (ref 22–29)
COLOR: ABNORMAL
CREAT SERPL-MCNC: 0.8 MG/DL (ref 0.5–1)
CREAT SERPL-MCNC: 1 MG/DL (ref 0.5–1)
EOSINOPHILS ABSOLUTE: 0.08 E9/L (ref 0.05–0.5)
EOSINOPHILS ABSOLUTE: 0.13 E9/L (ref 0.05–0.5)
EOSINOPHILS RELATIVE PERCENT: 1.2 % (ref 0–6)
EOSINOPHILS RELATIVE PERCENT: 2.1 % (ref 0–6)
EPITHELIAL CELLS, UA: ABNORMAL /HPF
GFR AFRICAN AMERICAN: >60
GFR AFRICAN AMERICAN: >60
GFR NON-AFRICAN AMERICAN: 51 ML/MIN/1.73
GFR NON-AFRICAN AMERICAN: >60 ML/MIN/1.73
GLUCOSE BLD-MCNC: 103 MG/DL (ref 74–99)
GLUCOSE BLD-MCNC: 105 MG/DL (ref 74–99)
GLUCOSE URINE: NEGATIVE MG/DL
HCT VFR BLD CALC: 32.5 % (ref 34–48)
HCT VFR BLD CALC: 33.7 % (ref 34–48)
HEMOGLOBIN: 11.2 G/DL (ref 11.5–15.5)
HEMOGLOBIN: 11.6 G/DL (ref 11.5–15.5)
IMMATURE GRANULOCYTES #: 0.01 E9/L
IMMATURE GRANULOCYTES #: 0.02 E9/L
IMMATURE GRANULOCYTES %: 0.1 % (ref 0–5)
IMMATURE GRANULOCYTES %: 0.3 % (ref 0–5)
KETONES, URINE: NEGATIVE MG/DL
LEUKOCYTE ESTERASE, URINE: ABNORMAL
LYMPHOCYTES ABSOLUTE: 1.51 E9/L (ref 1.5–4)
LYMPHOCYTES ABSOLUTE: 1.67 E9/L (ref 1.5–4)
LYMPHOCYTES RELATIVE PERCENT: 22.2 % (ref 20–42)
LYMPHOCYTES RELATIVE PERCENT: 27.4 % (ref 20–42)
MCH RBC QN AUTO: 32.2 PG (ref 26–35)
MCH RBC QN AUTO: 32.6 PG (ref 26–35)
MCHC RBC AUTO-ENTMCNC: 34.4 % (ref 32–34.5)
MCHC RBC AUTO-ENTMCNC: 34.5 % (ref 32–34.5)
MCV RBC AUTO: 93.4 FL (ref 80–99.9)
MCV RBC AUTO: 94.7 FL (ref 80–99.9)
MONOCYTES ABSOLUTE: 0.57 E9/L (ref 0.1–0.95)
MONOCYTES ABSOLUTE: 0.63 E9/L (ref 0.1–0.95)
MONOCYTES RELATIVE PERCENT: 10.3 % (ref 2–12)
MONOCYTES RELATIVE PERCENT: 8.4 % (ref 2–12)
NEUTROPHILS ABSOLUTE: 3.64 E9/L (ref 1.8–7.3)
NEUTROPHILS ABSOLUTE: 4.62 E9/L (ref 1.8–7.3)
NEUTROPHILS RELATIVE PERCENT: 59.7 % (ref 43–80)
NEUTROPHILS RELATIVE PERCENT: 68 % (ref 43–80)
NITRITE, URINE: NEGATIVE
PDW BLD-RTO: 12.5 FL (ref 11.5–15)
PDW BLD-RTO: 12.7 FL (ref 11.5–15)
PH UA: 6.5 (ref 5–9)
PLATELET # BLD: 258 E9/L (ref 130–450)
PLATELET # BLD: 272 E9/L (ref 130–450)
PMV BLD AUTO: 9.3 FL (ref 7–12)
PMV BLD AUTO: 9.5 FL (ref 7–12)
POTASSIUM REFLEX MAGNESIUM: 4.6 MMOL/L (ref 3.5–5)
POTASSIUM SERPL-SCNC: 4.5 MMOL/L (ref 3.5–5)
PROTEIN UA: >=300 MG/DL
RBC # BLD: 3.48 E12/L (ref 3.5–5.5)
RBC # BLD: 3.56 E12/L (ref 3.5–5.5)
RBC UA: ABNORMAL /HPF (ref 0–2)
SODIUM BLD-SCNC: 134 MMOL/L (ref 132–146)
SODIUM BLD-SCNC: 135 MMOL/L (ref 132–146)
SPECIFIC GRAVITY UA: 1.02 (ref 1–1.03)
TOTAL PROTEIN: 6 G/DL (ref 6.4–8.3)
UROBILINOGEN, URINE: 0.2 E.U./DL
WBC # BLD: 6.1 E9/L (ref 4.5–11.5)
WBC # BLD: 6.8 E9/L (ref 4.5–11.5)
WBC UA: >20 /HPF (ref 0–5)

## 2021-09-17 PROCEDURE — 72125 CT NECK SPINE W/O DYE: CPT

## 2021-09-17 PROCEDURE — 71260 CT THORAX DX C+: CPT

## 2021-09-17 PROCEDURE — 36415 COLL VENOUS BLD VENIPUNCTURE: CPT

## 2021-09-17 PROCEDURE — 85025 COMPLETE CBC W/AUTO DIFF WBC: CPT

## 2021-09-17 PROCEDURE — 74177 CT ABD & PELVIS W/CONTRAST: CPT

## 2021-09-17 PROCEDURE — BT101ZZ FLUOROSCOPY OF BLADDER USING LOW OSMOLAR CONTRAST: ICD-10-PCS | Performed by: RADIOLOGY

## 2021-09-17 PROCEDURE — 6360000002 HC RX W HCPCS: Performed by: FAMILY MEDICINE

## 2021-09-17 PROCEDURE — 92610 EVALUATE SWALLOWING FUNCTION: CPT | Performed by: SPEECH-LANGUAGE PATHOLOGIST

## 2021-09-17 PROCEDURE — 74430 CONTRAST X-RAY BLADDER: CPT

## 2021-09-17 PROCEDURE — 6370000000 HC RX 637 (ALT 250 FOR IP): Performed by: FAMILY MEDICINE

## 2021-09-17 PROCEDURE — 80048 BASIC METABOLIC PNL TOTAL CA: CPT

## 2021-09-17 PROCEDURE — 87088 URINE BACTERIA CULTURE: CPT

## 2021-09-17 PROCEDURE — 1200000000 HC SEMI PRIVATE

## 2021-09-17 PROCEDURE — 6360000004 HC RX CONTRAST MEDICATION: Performed by: NURSE PRACTITIONER

## 2021-09-17 PROCEDURE — 99222 1ST HOSP IP/OBS MODERATE 55: CPT | Performed by: SURGERY

## 2021-09-17 PROCEDURE — 6360000004 HC RX CONTRAST MEDICATION: Performed by: RADIOLOGY

## 2021-09-17 PROCEDURE — 6370000000 HC RX 637 (ALT 250 FOR IP): Performed by: STUDENT IN AN ORGANIZED HEALTH CARE EDUCATION/TRAINING PROGRAM

## 2021-09-17 PROCEDURE — 81001 URINALYSIS AUTO W/SCOPE: CPT

## 2021-09-17 PROCEDURE — 2580000003 HC RX 258: Performed by: FAMILY MEDICINE

## 2021-09-17 PROCEDURE — 80053 COMPREHEN METABOLIC PANEL: CPT

## 2021-09-17 RX ORDER — MINERAL OIL, LIGHT/MINERAL OIL 1%-4.5%
1 DROPS OPHTHALMIC (EYE) PRN
Status: DISCONTINUED | OUTPATIENT
Start: 2021-09-17 | End: 2021-09-24 | Stop reason: HOSPADM

## 2021-09-17 RX ORDER — POLYETHYLENE GLYCOL 3350 17 G/17G
17 POWDER, FOR SOLUTION ORAL 2 TIMES DAILY
Status: DISCONTINUED | OUTPATIENT
Start: 2021-09-17 | End: 2021-09-24 | Stop reason: HOSPADM

## 2021-09-17 RX ADMIN — Medication 5 MG: at 21:40

## 2021-09-17 RX ADMIN — Medication 10 ML: at 16:03

## 2021-09-17 RX ADMIN — POLYETHYLENE GLYCOL 3350 17 G: 17 POWDER, FOR SOLUTION ORAL at 11:11

## 2021-09-17 RX ADMIN — PANTOPRAZOLE SODIUM 40 MG: 40 TABLET, DELAYED RELEASE ORAL at 11:09

## 2021-09-17 RX ADMIN — IOTHALAMATE MEGLUMINE 250 ML: 172 INJECTION URETERAL at 15:08

## 2021-09-17 RX ADMIN — POLYETHYLENE GLYCOL 3350 17 G: 17 POWDER, FOR SOLUTION ORAL at 21:40

## 2021-09-17 RX ADMIN — HALOPERIDOL 0.5 MG: 0.5 TABLET ORAL at 11:08

## 2021-09-17 RX ADMIN — IOPAMIDOL 80 ML: 755 INJECTION, SOLUTION INTRAVENOUS at 06:18

## 2021-09-17 RX ADMIN — MORPHINE SULFATE 2 MG: 2 INJECTION, SOLUTION INTRAMUSCULAR; INTRAVENOUS at 18:04

## 2021-09-17 RX ADMIN — WATER 1000 MG: 1 INJECTION INTRAMUSCULAR; INTRAVENOUS; SUBCUTANEOUS at 16:02

## 2021-09-17 RX ADMIN — ACETAMINOPHEN 650 MG: 325 TABLET ORAL at 12:44

## 2021-09-17 ASSESSMENT — PAIN SCALES - GENERAL
PAINLEVEL_OUTOF10: 5
PAINLEVEL_OUTOF10: 0
PAINLEVEL_OUTOF10: 3

## 2021-09-17 ASSESSMENT — ENCOUNTER SYMPTOMS: BACK PAIN: 1

## 2021-09-17 NOTE — PROGRESS NOTES
Visit Diagnoses       Codes    Closed fracture of ramus of right pubis, initial encounter [facility])    -  Primary S32.591A    Closed fracture of sacrum, unspecified portion of sacrum, initial encounter ([facility] [address]     S32.10XA    Closed fracture of transverse process of lumbar vertebra, initial encounter ([facility] [address]     S32.009A           PATIENT REPORT/COMPLAINT: not report of dysphagia or changes in speech per daughter     meal tray present during evaluation     PRIOR LEVEL OF SWALLOW FUNCTION:    PAST HISTORY OF DYSPHAGIA?: none reported    Home diet: Regular consistency solids with  thin liquids  Diet during hospital admission: Regular consistency solids with thin liquids  PROCEDURE:  Consistencies Administered During the Evaluation   Liquids: thin liquid   Solids:  pureed foods and soft solid foods      Method of Intake:   cup, straw, spoon  Self fed      Position:   Seated, upright    CLINICAL ASSESSMENT  Oral Stage: The oral stage of swallowing was within functional limits      Pharyngeal Stage:    No signs of aspiration were noted during this evaluation however, silent aspiration cannot be ruled out at bedside. If silent aspiration is suspected, a Videofluoroscopic Study of Swallowing (MBS) is recommended and requires a physician order. Cognition:   Within functional limits for this exam and Confusion noted which daughter reports is baseline for patient     Oral Peripheral Examination   Adequate lingual/labial strength     Current Respiratory Status    room air     Parameters of Speech Production  Respiration:  Adequate for speech production  Quality:   Within functional limits  Intensity: Within functional limits    Volitional Swallow: Present    Volitional Cough:    Present    Pain: No pain reported. EDUCATION:   The Speech Language Pathologist (SLP) completed education regarding results of evaluation and that intervention is not warranted at this time.   Learner: Patient  Education:  Reviewed results and recommendations of this evaluation  Evaluation of Education: Verbalizes understanding--family    This plan will be re-evaluated and revised in 1 week  if warranted. CPT code:  22914  bedside swallow eval      [x]The admitting diagnosis and active problem list, have been reviewed prior to initiation of this evaluation.         ACTIVE PROBLEM LIST:   Patient Active Problem List   Diagnosis    Vasovagal near syncope    Tension headache    Tremor    TMJ (temporomandibular joint disorder)    Cervical muscle pain    Pubic ramus fracture, right, closed, initial encounter ([facility] [address]           Josiah Dominique MSCCC/SLP  Speech Language Pathologist  [id number]

## 2021-09-17 NOTE — CONSULTS
9/17/2021 8:08 AM  Service: Urology  Group: [facility] ([facility])    Tanisha Alcaraz  [id number]     Chief Complaint:    Hematuria     History of Present Illness: The patient is a 80 y.o. female patient who presented to the hospital yesterday from a nursing home after suffering from a fall. She was found to have a pubic rami fracture. She was noted to have gross hematuria and we were asked to evaluate. A 3 way love catheter was inserted. This is currently draining dark cherry urine. She is on Plavix, which has been held at this time. She has a history of dementia and is unable to provide any accurate medical history at this time. Her daughter is present who provides all the history. She reports that the facility contacted her earlier this week for gross hematuria and UTI. She was placed on Bactrim per her daughter. She is incontinent of urine and wears depends, she is unsure how many of these she wears per day.     Past Medical History:   Diagnosis Date    Anxiety     Bladder incontinence     Hyperlipidemia     Osteoarthritis     Sinus infection          Past Surgical History:   Procedure Laterality Date    APPENDECTOMY      EYE SURGERY Left 11/2016    cataract    FOOT SURGERY      HIP FRACTURE SURGERY      HYSTERECTOMY      KNEE ARTHROSCOPY      ROTATOR CUFF REPAIR      TONSILLECTOMY         Medications Prior to Admission:    Medications Prior to Admission: acetaminophen (TYLENOL) 500 MG tablet, Take 1,000 mg by mouth every 6 hours as needed for Pain  loratadine (CLARITIN) 10 MG tablet, Take 10 mg by mouth daily as needed (Allergies)  melatonin 5 MG TABS tablet, Take 5 mg by mouth nightly  pantoprazole (PROTONIX) 40 MG tablet, Take 40 mg by mouth daily  trolamine salicylate (ASPERCREME/ALOE) 10 % cream, Apply 1 each topically as needed for Pain  ciprofloxacin (CIPRO) 250 MG tablet, Take 250 mg by mouth 2 times daily  polyethylene glycol (GLYCOLAX) 17 GM/SCOOP powder, Take 17 g by mouth daily as needed (Constipation)  escitalopram (LEXAPRO) 20 MG tablet, Take 20 mg by mouth daily  haloperidol (HALDOL) 0.5 MG tablet, Take 0.5 mg by mouth daily  carboxymethylcellulose PF (REFRESH PLUS) 0.5 % SOLN ophthalmic solution, Place 1 drop into both eyes as needed (Dry Eyes)  sulfamethoxazole-trimethoprim (BACTRIM DS;SEPTRA DS) 800-160 MG per tablet, Take 1 tablet by mouth 2 times daily  diclofenac (VOLTAREN) 50 MG EC tablet, Take 50 mg by mouth 3 times daily  clopidogrel (PLAVIX) 75 MG tablet, Take 75 mg by mouth every other day. Multiple Vitamins-Minerals (CENTRUM SILVER PO), Take 1 tablet by mouth daily     Allergies:    Zoloft [sertraline hcl]    Social History:    reports that she has never smoked. She has never used smokeless tobacco. She reports that she does not drink alcohol and does not use drugs. Family History:   Non-contributory to this Urological problem  family history includes Heart Disease in her father and mother; High Blood Pressure in her sister; High Cholesterol in her brother and sister. Review of Systems  Unable to obtain due to confusion     Physical Exam:     Vitals:  /62   Pulse 69   Temp 98.4 °F (36.9 °C) (Oral)   Resp 14   Ht 5' 6\" (1.676 m)   Wt 146 lb (66.2 kg)   SpO2 95%   BMI 23.57 kg/m²     General:  Awake and alert, confused, no distress   HEENT:  Normocephalic, atraumatic. Lungs:  Respirations symmetric and non-labored. Abdomen:  soft, nontender, no masses  Extremities:  No clubbing, cyanosis, or edema  Skin:  Warm and dry, no open lesions or rashes  Neuro: There are no motor or sensory deficits in the 4 quadrant extremities   Rectal: deferred  Genitourinary:   Germain draining dark cherry urine     Labs:     Recent Labs     09/17/21  0013   WBC 6.1   RBC 3.48*   HGB 11.2*   HCT 32.5*   MCV 93.4   MCH 32.2   MCHC 34.5   RDW 12.7      MPV 9.3         Recent Labs     09/17/21  0013   CREATININE 1.0       No results found for: PSA    Imaging: Narrative   EXAMINATION:   CT OF THE ABDOMEN AND PELVIS WITH CONTRAST 9/17/2021 5:26 am       TECHNIQUE:   CT of the abdomen and pelvis was performed with the administration of   intravenous contrast. Multiplanar reformatted images are provided for review. Dose modulation, iterative reconstruction, and/or weight based adjustment of   the mA/kV was utilized to reduce the radiation dose to as low as reasonably   achievable.       COMPARISON:   CT pelvis of 09/16/2021       HISTORY:   ORDERING SYSTEM PROVIDED HISTORY: trauma   TECHNOLOGIST PROVIDED HISTORY:   Additional Contrast?->None   Reason for exam:->trauma       FINDINGS:   Lower Chest: The visualized portions of the lung bases are clear.  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       Organs: There are calcifications throughout pancreas which are consistent   with prior episodes of pancreatitis.  There no findings to suggest acute   pancreatitis.  The liver, spleen, adrenal glands and kidneys demonstrate no   acute abnormality.  There is a 5 cm right renal cyst. Yanick Nanny is no evidence   for solid organ injury.       GI/Bowel: There are no findings of intestinal obstruction. Yanick Nanny is   prominent amount of stool throughout the colon.  Correlate for constipation. The appendix is not visualized.  There is sigmoid diverticulosis.  There no   acute diverticulitis.  Gallbladder appears grossly unremarkable.       Pelvis:  There is a Germain catheter in the bladder.  There is diffuse bladder   wall thickening concerning for cystitis.  Coronal images suggest possible   mucosal mass along the right side.  Bladder mass not excluded. Yanick Nanny is   additional hyperdensity in the bladder which is probably blood clots.       Peritoneum/Retroperitoneum: There are aortoiliac atherosclerotic   calcification.  There is no abdominal aortic aneurysm.  There is no free   intraperitoneal air.  There is no abnormal fluid collection.       Bones/Soft Tissues: Prominent lumbar degenerative changes.  There are   displaced fractures of the right pubic rami.  There is also fracture through   the right sacral ala.  There appears to be bony resorption along fracture   margins suggesting that fractures are more likely subacute than acute. Correlate with history. Roosevelt Tyson is a fracture of L5 right transverse process   of unknown age, either acute or subacute.           Impression   1. No solid organ injury seen. 2. Pancreatic calcifications are consistent with the wall episodes of   pancreatitis. Roosevelt Tyson is no evidence for acute pancreatitis. 3. Large amount of stool throughout colon.  Correlate constipation. 4. Sigmoid diverticulosis.  No acute diverticulitis seen. 5. Abnormal bladder. Roosevelt Tyson is diffuse wall thickening which could be   cystitis. Roosevelt Tyson is some focal mucosal thickening on the right side.  A   bladder mass not excluded.  Hyperdensity in the bladder is probably blood   clots. 6. Pelvic fractures including displaced right pubic rami fractures and   fracture involving right sacral ala.  There is bony resorption along the   fracture margins suggesting that these are subacute.  Correlate clinically.    7. L5 right transverse process fracture could be acute or subacute.                                 Assessment/plan:  Gross Hematuria   UTI   Incontinence  Probable hypotonic bladder  Incomplete Bladder Emptying   Bilateral renal cyst      Germain was hand irrigated, moderate clot retrieval, urine returned to a light pink in color    UA reviewed  Urine culture pending  Antibiotics per primary  Cont to watch the hemoglobin  Transfusions per primary  Plavix has been placed on hold  Suspect underlying hypotonic bladder component as well  Germain will remain indwelling at this time  Cont the manual irrigations  Will follow   Her daughter was present  Cystogram was negative  The hematuria is most likely from the distention  All options were discussed    Harir, DO, FACS          Electronically signed by JAIDEN Gould CNP on 9/17/2021 at 8:08 AM

## 2021-09-17 NOTE — CARE COORDINATION
CM note: Met with patient and daughter/POA, Lalitbev Alves for transition of care planning. Pt has dementia, lives at [address] [place] [address]. Daughter is not sure of discharge plan but is open to rehab/placement if needed. Provided daughter list of SNF facilities, highlighted those that accept patient's insurance and requested that she have three choices by priority in the event that patient is unable to return to [place]. Pt currently has a urinary catheter, daughter states she knows patient will not be able to return to [place] if she will require the catheter at discharge.

## 2021-09-17 NOTE — PROGRESS NOTES
Date:2021  Patient Name: Yimi Chapa  MRN: [id number]  : 1926  ROOM #: [id number]    Physical Therapy order received, chart reviewed and evaluation attempted this date. Patient is unavailable for PT evaluation due to awaiting ortho consult. Will attempt PT evaluation at a later time. Thank you.    Rhina Mendosa, PT, DPT

## 2021-09-17 NOTE — H&P
HISTORY AND PHYSICAL             Date: 9/17/2021        Patient Name: Suzanne Victoria     YOB: 1926      Age:  80 y.o. Chief Complaint     Chief Complaint   Patient presents with    Fall     pt fell from a standing position. pt has no complaints of pain and does not remember the fall due to her dementia          History Obtained From   patient    History of Present Illness   80 y female from NH. Davidne Lenka from standing position. Sustained an extensive pelvic fracture and lumbar fracture. Also has gross hematuria and UTI. Poor historian. Advanced dementia. Rest of history obtained from medical record    Past Medical History     Past Medical History:   Diagnosis Date    Anxiety     Bladder incontinence     Hyperlipidemia     Osteoarthritis     Sinus infection         Past Surgical History     Past Surgical History:   Procedure Laterality Date    APPENDECTOMY      EYE SURGERY Left 11/2016    cataract    FOOT SURGERY      HIP FRACTURE SURGERY      HYSTERECTOMY      KNEE ARTHROSCOPY      ROTATOR CUFF REPAIR      TONSILLECTOMY          Medications Prior to Admission     Prior to Admission medications    Medication Sig Start Date End Date Taking?  Authorizing Provider   acetaminophen (TYLENOL) 500 MG tablet Take 1,000 mg by mouth every 6 hours as needed for Pain   Yes Historical Provider, MD   loratadine (CLARITIN) 10 MG tablet Take 10 mg by mouth daily as needed (Allergies)   Yes Historical Provider, MD   melatonin 5 MG TABS tablet Take 5 mg by mouth nightly   Yes Historical Provider, MD   pantoprazole (PROTONIX) 40 MG tablet Take 40 mg by mouth daily   Yes Historical Provider, MD   trolamine salicylate (ASPERCREME/ALOE) 10 % cream Apply 1 each topically as needed for Pain   Yes Historical Provider, MD   ciprofloxacin (CIPRO) 250 MG tablet Take 250 mg by mouth 2 times daily   Yes Historical Provider, MD   polyethylene glycol (GLYCOLAX) 17 GM/SCOOP powder Take 17 g by mouth daily as needed (Constipation)   Yes Historical Provider, MD   escitalopram (LEXAPRO) 20 MG tablet Take 20 mg by mouth daily   Yes Historical Provider, MD   haloperidol (HALDOL) 0.5 MG tablet Take 0.5 mg by mouth daily   Yes Historical Provider, MD   carboxymethylcellulose PF (REFRESH PLUS) 0.5 % SOLN ophthalmic solution Place 1 drop into both eyes as needed (Dry Eyes)   Yes Historical Provider, MD   sulfamethoxazole-trimethoprim (BACTRIM DS;SEPTRA DS) 800-160 MG per tablet Take 1 tablet by mouth 2 times daily   Yes Historical Provider, MD   diclofenac (VOLTAREN) 50 MG EC tablet Take 50 mg by mouth 3 times daily   Yes Historical Provider, MD   clopidogrel (PLAVIX) 75 MG tablet Take 75 mg by mouth every other day. Yes Historical Provider, MD   Multiple Vitamins-Minerals (CENTRUM SILVER PO) Take 1 tablet by mouth daily  3/8/11  Yes Historical Provider, MD        Allergies   Zoloft [sertraline hcl]    Social History     Social History     Tobacco History     Smoking Status  Never Smoker    Smokeless Tobacco Use  Never Used          Alcohol History     Alcohol Use Status  No          Drug Use     Drug Use Status  No          Sexual Activity     Sexually Active  Not Asked                Family History     Family History   Problem Relation Age of Onset    Heart Disease Mother     Heart Disease Father     High Blood Pressure Sister     High Cholesterol Sister     High Cholesterol Brother        Review of Systems   Review of Systems   Musculoskeletal: Positive for back pain, gait problem and joint swelling. Psychiatric/Behavioral: Positive for confusion. All other systems reviewed and are negative. Physical Exam   /62   Pulse 69   Temp 98.4 °F (36.9 °C) (Oral)   Resp 14   Ht 5' 6\" (1.676 m)   Wt 146 lb (66.2 kg)   SpO2 95%   BMI 23.57 kg/m²     Physical Exam  Vitals and nursing note reviewed. HENT:      Head: Normocephalic and atraumatic.       Nose: Nose normal.      Mouth/Throat:      Mouth: Mucous membranes are dry. Eyes:      Extraocular Movements: Extraocular movements intact. Conjunctiva/sclera: Conjunctivae normal.      Pupils: Pupils are equal, round, and reactive to light. Cardiovascular:      Rate and Rhythm: Normal rate. Pulses: Normal pulses. Heart sounds: Normal heart sounds. Pulmonary:      Effort: Pulmonary effort is normal.      Breath sounds: Normal breath sounds. Abdominal:      General: Abdomen is flat. Bowel sounds are normal.      Palpations: Abdomen is soft. Musculoskeletal:         General: Swelling and tenderness present. Cervical back: Normal range of motion and neck supple. Right lower leg: No edema. Skin:     General: Skin is warm and dry. Neurological:      Mental Status: She is alert. She is disoriented. Motor: Weakness present.       Gait: Gait abnormal.   Psychiatric:         Mood and Affect: Mood normal.         Labs      Recent Results (from the past 24 hour(s))   CBC WITH AUTO DIFFERENTIAL    Collection Time: 09/17/21 12:13 AM   Result Value Ref Range    WBC 6.1 4.5 - 11.5 E9/L    RBC 3.48 (L) 3.50 - 5.50 E12/L    Hemoglobin 11.2 (L) 11.5 - 15.5 g/dL    Hematocrit 32.5 (L) 34.0 - 48.0 %    MCV 93.4 80.0 - 99.9 fL    MCH 32.2 26.0 - 35.0 pg    MCHC 34.5 32.0 - 34.5 %    RDW 12.7 11.5 - 15.0 fL    Platelets 263 534 - 801 E9/L    MPV 9.3 7.0 - 12.0 fL    Neutrophils % 59.7 43.0 - 80.0 %    Immature Granulocytes % 0.3 0.0 - 5.0 %    Lymphocytes % 27.4 20.0 - 42.0 %    Monocytes % 10.3 2.0 - 12.0 %    Eosinophils % 2.1 0.0 - 6.0 %    Basophils % 0.2 0.0 - 2.0 %    Neutrophils Absolute 3.64 1.80 - 7.30 E9/L    Immature Granulocytes # 0.02 E9/L    Lymphocytes Absolute 1.67 1.50 - 4.00 E9/L    Monocytes Absolute 0.63 0.10 - 0.95 E9/L    Eosinophils Absolute 0.13 0.05 - 0.50 E9/L    Basophils Absolute 0.01 0.00 - 0.20 E9/L   Comprehensive metabolic panel    Collection Time: 09/17/21 12:13 AM   Result Value Ref Range    Sodium 134 132 - 146 mmol/L Potassium 4.5 3.5 - 5.0 mmol/L    Chloride 102 98 - 107 mmol/L    CO2 25 22 - 29 mmol/L    Anion Gap 7 7 - 16 mmol/L    Glucose 105 (H) 74 - 99 mg/dL    BUN 24 (H) 6 - 23 mg/dL    CREATININE 1.0 0.5 - 1.0 mg/dL    GFR Non-African American 51 >=60 mL/min/1.73    GFR African American >60     Calcium 8.8 8.6 - 10.2 mg/dL    Total Protein 6.0 (L) 6.4 - 8.3 g/dL    Albumin 3.7 3.5 - 5.2 g/dL    Total Bilirubin 0.3 0.0 - 1.2 mg/dL    Alkaline Phosphatase 62 35 - 104 U/L    ALT 12 0 - 32 U/L    AST 16 0 - 31 U/L   Urinalysis    Collection Time: 09/17/21  2:00 AM   Result Value Ref Range    Color, UA RED (A) Straw/Yellow    Clarity, UA TURBID (A) Clear    Glucose, Ur Negative Negative mg/dL    Bilirubin Urine Negative Negative    Ketones, Urine Negative Negative mg/dL    Specific Gravity, UA 1.020 1.005 - 1.030    Blood, Urine LARGE (A) Negative    pH, UA 6.5 5.0 - 9.0    Protein, UA >=300 (A) Negative mg/dL    Urobilinogen, Urine 0.2 <2.0 E.U./dL    Nitrite, Urine Negative Negative    Leukocyte Esterase, Urine LARGE (A) Negative   Microscopic Urinalysis    Collection Time: 09/17/21  2:00 AM   Result Value Ref Range    WBC, UA >20 (A) 0 - 5 /HPF    RBC, UA PACKED 0 - 2 /HPF    Epithelial Cells, UA FEW /HPF    Bacteria, UA MANY (A) None Seen /HPF   Basic Metabolic Panel w/ Reflex to MG    Collection Time: 09/17/21  9:15 AM   Result Value Ref Range    Sodium 135 132 - 146 mmol/L    Potassium reflex Magnesium 4.6 3.5 - 5.0 mmol/L    Chloride 101 98 - 107 mmol/L    CO2 24 22 - 29 mmol/L    Anion Gap 10 7 - 16 mmol/L    Glucose 103 (H) 74 - 99 mg/dL    BUN 18 6 - 23 mg/dL    CREATININE 0.8 0.5 - 1.0 mg/dL    GFR Non-African American >60 >=60 mL/min/1.73    GFR African American >60     Calcium 8.9 8.6 - 10.2 mg/dL   CBC auto differential    Collection Time: 09/17/21  9:15 AM   Result Value Ref Range    WBC 6.8 4.5 - 11.5 E9/L    RBC 3.56 3.50 - 5.50 E12/L    Hemoglobin 11.6 11.5 - 15.5 g/dL    Hematocrit 33.7 (L) 34.0 - 48.0 % MCV 94.7 80.0 - 99.9 fL    MCH 32.6 26.0 - 35.0 pg    MCHC 34.4 32.0 - 34.5 %    RDW 12.5 11.5 - 15.0 fL    Platelets 501 623 - 179 E9/L    MPV 9.5 7.0 - 12.0 fL    Neutrophils % 68.0 43.0 - 80.0 %    Immature Granulocytes % 0.1 0.0 - 5.0 %    Lymphocytes % 22.2 20.0 - 42.0 %    Monocytes % 8.4 2.0 - 12.0 %    Eosinophils % 1.2 0.0 - 6.0 %    Basophils % 0.1 0.0 - 2.0 %    Neutrophils Absolute 4.62 1.80 - 7.30 E9/L    Immature Granulocytes # 0.01 E9/L    Lymphocytes Absolute 1.51 1.50 - 4.00 E9/L    Monocytes Absolute 0.57 0.10 - 0.95 E9/L    Eosinophils Absolute 0.08 0.05 - 0.50 E9/L    Basophils Absolute 0.01 0.00 - 0.20 E9/L        Imaging/Diagnostics Last 24 Hours   XR PELVIS (1-2 VIEWS)    Result Date: 9/16/2021  EXAMINATION: ONE XRAY VIEW OF THE PELVIS 9/16/2021 3:55 pm COMPARISON: None. HISTORY: ORDERING SYSTEM PROVIDED HISTORY: fall TECHNOLOGIST PROVIDED HISTORY: Reason for exam:->fall FINDINGS: There is mildly displaced and comminuted right superior pubic ramus fracture. Hardware in the visualized proximal right femur. Degenerative changes in the visualized lumbar spine. Comminuted right superior pubic ramus fracture. CT HEAD WO CONTRAST    Result Date: 9/16/2021  EXAMINATION: CT OF THE HEAD WITHOUT CONTRAST  9/16/2021 3:41 pm TECHNIQUE: CT of the head was performed without the administration of intravenous contrast. Dose modulation, iterative reconstruction, and/or weight based adjustment of the mA/kV was utilized to reduce the radiation dose to as low as reasonably achievable. COMPARISON: None. HISTORY: ORDERING SYSTEM PROVIDED HISTORY: Trauma TECHNOLOGIST PROVIDED HISTORY: Has a \"code stroke\" or \"stroke alert\" been called? ->No Reason for exam:->Trauma Decision Support Exception - unselect if not a suspected or confirmed emergency medical condition->Emergency Medical Condition (MA) FINDINGS: BRAIN/VENTRICLES: There is no acute intracranial hemorrhage, mass effect or midline shift.   No abnormal extra-axial fluid collection. The gray-white differentiation is maintained without evidence of an acute infarct. There is no evidence of hydrocephalus. The ventricles, cisterns and sulci are prominent consistent with atrophy. There is decreased attenuation within the periventricular white matter consistent with periventricular leukomalacia. ORBITS: The visualized portion of the orbits demonstrate no acute abnormality. SINUSES: The visualized paranasal sinuses and mastoid air cells demonstrate no acute abnormality. SOFT TISSUES/SKULL:  No acute abnormality of the visualized skull or soft tissues. 1. There is no acute intracranial abnormality. Specifically, there is no intracranial hemorrhage. 2. Advanced atrophy and periventricular leukomalacia,     CT CHEST W CONTRAST    Result Date: 9/17/2021  EXAMINATION: CT OF THE CHEST WITH CONTRAST 9/17/2021 5:26 am TECHNIQUE: CT of the chest was performed with the administration of intravenous contrast. Multiplanar reformatted images are provided for review. Dose modulation, iterative reconstruction, and/or weight based adjustment of the mA/kV was utilized to reduce the radiation dose to as low as reasonably achievable. COMPARISON: None. HISTORY: ORDERING SYSTEM PROVIDED HISTORY: trauma TECHNOLOGIST PROVIDED HISTORY: Reason for exam:->trauma FINDINGS: Mediastinum: There are coronary artery calcifications. There is no abnormal mediastinal or hilar mass seen. There is no abnormal mediastinal air or abnormal mediastinal fluid collection. Lungs/pleura: There is no acute infiltrate. There is no pleural effusion or pneumothorax. Upper Abdomen: No acute abnormality seen in visualized upper abdomen. Soft Tissues/Bones: There is pectus excavatum deformity of the anterior chest wall.      No acute post-traumatic abnormality seen in the chest.     CT CERVICAL SPINE WO CONTRAST    Result Date: 9/17/2021  EXAMINATION: CT OF THE CERVICAL SPINE WITHOUT CONTRAST 9/17/2021 5:26 am TECHNIQUE: CT of the cervical spine was performed without the administration of intravenous contrast. Multiplanar reformatted images are provided for review. Dose modulation, iterative reconstruction, and/or weight based adjustment of the mA/kV was utilized to reduce the radiation dose to as low as reasonably achievable. COMPARISON: Cervical spine CT May 4, 2019 HISTORY: ORDERING SYSTEM PROVIDED HISTORY: trauma TECHNOLOGIST PROVIDED HISTORY: Reason for exam:->trauma FINDINGS: BONES/ALIGNMENT: There is no acute fracture or traumatic malalignment. DEGENERATIVE CHANGES: No change in multilevel degenerative disc disease seen throughout the cervical spine. SOFT TISSUES: There is no prevertebral soft tissue swelling. No traumatic injuries of cervical spine     CT PELVIS WO CONTRAST Additional Contrast? None    Result Date: 9/16/2021  EXAMINATION: CT OF THE PELVIS WITHOUT CONTRAST 9/16/2021 5:19 pm TECHNIQUE: CT of the pelvis was performed without the administration of intravenous contrast.  Multiplanar reformatted images are provided for review. Adjustment of mA and/or kV according to patient size was utilized. Dose modulation, iterative reconstruction, and/or weight based adjustment of the mA/kV was utilized to reduce the radiation dose to as low as reasonably achievable. COMPARISON: AP pelvis 09/16/2021 at 1555 hours. HISTORY: ORDERING SYSTEM PROVIDED HISTORY: pubic ramus fracture TECHNOLOGIST PROVIDED HISTORY: Reason for exam:->pubic ramus fracture Additional Contrast?->None Decision Support Exception - unselect if not a suspected or confirmed emergency medical condition->Emergency Medical Condition (MA) FINDINGS: There is comminuted fracture of the right parasymphyseal pubis with comminuted, segmental fracture in the right inferior pubic ramus and comminuted fracture of the right superior pubic ramus. There is also vertically oriented, distracted fracture through the right sacral ala.   There is minimally displaced fracture at the right L5 transverse process. There is degenerative disc disease at L4-5 and L5-S1 as well as bilateral facet arthropathy at both levels. There is intramedullary nail and compression screw in the right femur. The urinary bladder is distended with mild thickening of the wall which may represent chronic cystitis. There is a large amount of stool throughout the visualized portions of the colon. There is colonic diverticulosis. There is atherosclerotic disease. 1. Comminuted fractures at the right parasymphyseal pubis as well as the superior and inferior pubic rami. 2. Longitudinal fracture involving the right sacral ala. 3. Minimally displaced fracture at the right L5 transverse process. CT ABDOMEN PELVIS W IV CONTRAST Additional Contrast? None    Result Date: 9/17/2021  EXAMINATION: CT OF THE ABDOMEN AND PELVIS WITH CONTRAST 9/17/2021 5:26 am TECHNIQUE: CT of the abdomen and pelvis was performed with the administration of intravenous contrast. Multiplanar reformatted images are provided for review. Dose modulation, iterative reconstruction, and/or weight based adjustment of the mA/kV was utilized to reduce the radiation dose to as low as reasonably achievable. COMPARISON: CT pelvis of 09/16/2021 HISTORY: ORDERING SYSTEM PROVIDED HISTORY: trauma TECHNOLOGIST PROVIDED HISTORY: Additional Contrast?->None Reason for exam:->trauma FINDINGS: Lower Chest: The visualized portions of the lung bases are clear. There is pectus excavatum deformity of the lower thorax. Organs: There are calcifications throughout pancreas which are consistent with prior episodes of pancreatitis. There no findings to suggest acute pancreatitis. The liver, spleen, adrenal glands and kidneys demonstrate no acute abnormality. There is a 5 cm right renal cyst.  There is no evidence for solid organ injury. GI/Bowel: There are no findings of intestinal obstruction.   There is prominent amount of stool throughout the colon. Correlate for constipation. The appendix is not visualized. There is sigmoid diverticulosis. There no acute diverticulitis. Gallbladder appears grossly unremarkable. Pelvis: There is a Germain catheter in the bladder. There is diffuse bladder wall thickening concerning for cystitis. Coronal images suggest possible mucosal mass along the right side. Bladder mass not excluded. There is additional hyperdensity in the bladder which is probably blood clots. Peritoneum/Retroperitoneum: There are aortoiliac atherosclerotic calcification. There is no abdominal aortic aneurysm. There is no free intraperitoneal air. There is no abnormal fluid collection. Bones/Soft Tissues: Prominent lumbar degenerative changes. There are displaced fractures of the right pubic rami. There is also fracture through the right sacral ala. There appears to be bony resorption along fracture margins suggesting that fractures are more likely subacute than acute. Correlate with history. There is a fracture of L5 right transverse process of unknown age, either acute or subacute. 1. No solid organ injury seen. 2. Pancreatic calcifications are consistent with the wall episodes of pancreatitis. There is no evidence for acute pancreatitis. 3. Large amount of stool throughout colon. Correlate constipation. 4. Sigmoid diverticulosis. No acute diverticulitis seen. 5. Abnormal bladder. There is diffuse wall thickening which could be cystitis. There is some focal mucosal thickening on the right side. A bladder mass not excluded. Hyperdensity in the bladder is probably blood clots. 6. Pelvic fractures including displaced right pubic rami fractures and fracture involving right sacral ala. There is bony resorption along the fracture margins suggesting that these are subacute. Correlate clinically. 7. L5 right transverse process fracture could be acute or subacute.        Assessment      Hospital Problems         Last Modified POA    Pubic ramus fracture, right, closed, initial encounter ([facility] [address] 9/16/2021 Yes      lumbar transverse fracture  Gross hematuria  UTI  Debility  Advanced dementia    Plan   1. Admit to 3 surgical  2. Consult ortho  3. Consult trauma  4. Consult urology  5. iV rocephin  6. Hold plavix, lovenox  7. Monitor labs  8. PT/OT  9. Resume home meds  10. Urine culture  11.  Will follow    Consultations Ordered:  IP CONSULT TO FAMILY MEDICINE  IP CONSULT TO ORTHOPEDIC SURGERY  IP CONSULT TO GENERAL SURGERY  IP CONSULT TO UROLOGY    Electronically signed by Loida Crockett DO on 9/17/21 at 1:44 PM EDT

## 2021-09-17 NOTE — CONSULTS
TRAUMA CONSULT  Surgical Resident/Advance Practice Nurse  9/17/2021  7:00 AM    PRIMARY SURVEY    CHIEF COMPLAINT:  Trauma consult. Injury occurred just prior to arrival patient is a 51-year-old female who lives in a nursing home. Patient is demented at baseline per EMR. Patient only alert and oriented to self. Apparently had a mechanical fall and work-up in the emergency department demonstrated pubic rami and sacral ala fracture. Patient was also having hematuria and three-way Germain was placed per nursing and still with persistent hematuria this morning. AIRWAY:   Airway Normal  EMS ETT Absent  Noisy respirations Absent  Retractions: Absent  Vomiting/bleeding: Absent      BREATHING:    Midaxillary breath sound left:  Normal  Midaxillary breath sound right:  Normal    Cough sound intensity:  good   FiO2: RA  SMI unable to participate.       CIRCULATION:   Femerol pulse intensity: Strong  Palpebral conjunctiva: Red      Vitals:    09/17/21 0530   BP: 122/62   Pulse: 69   Resp: 14   Temp: 98.4 °F (36.9 °C)   SpO2: 95%       Vitals:    09/16/21 1529 09/16/21 2300 09/17/21 0201 09/17/21 0530   BP: (!) 141/66 132/74 (!) 146/65 122/62   Pulse: 75 75 63 69   Resp: 18 16  14   Temp: 98.2 °F (36.8 °C) 99.2 °F (37.3 °C)  98.4 °F (36.9 °C)   TempSrc: Oral Oral  Oral   SpO2: 98%   95%   Weight:  146 lb (66.2 kg)     Height:  5' 6\" (1.676 m)          FAST EXAM: deferred    Central Nervous System    GCS Initial 15 minutes   Eye  Motor  Verbal 4 - Opens eyes on own  6 - Follows simple motor commands  4 - Seems confused, disoriented 4 - Opens eyes on own  6 - Follows simple motor commands  4 - Seems confused, disoriented     Neuromuscular blockade: No  Pupil size:  Left 3 mm    Right 3 mm  Pupil reaction: Yes    Wiggles fingers: Left Yes Right Yes  Wiggles toes: Left Yes   Right Yes    Hand grasp:   Left  Present      Right  Present  Plantar flexion: Left  Present      Right   Present    Loss of consciousness: unknown  History Obtained From:  Patient & EMS  Private Medical Doctor: Ava Miller DO    Pre-exisiting Medical History:  yes    Conditions:   Past Medical History:   Diagnosis Date    Anxiety     Bladder incontinence     Hyperlipidemia     Osteoarthritis     Sinus infection        Medications:   Prior to Admission medications    Medication Sig Start Date End Date Taking? Authorizing Provider   acetaminophen (TYLENOL) 500 MG tablet Take 1,000 mg by mouth every 6 hours as needed for Pain   Yes Historical Provider, MD   loratadine (CLARITIN) 10 MG tablet Take 10 mg by mouth daily as needed (Allergies)   Yes Historical Provider, MD   melatonin 5 MG TABS tablet Take 5 mg by mouth nightly   Yes Historical Provider, MD   pantoprazole (PROTONIX) 40 MG tablet Take 40 mg by mouth daily   Yes Historical Provider, MD   trolamine salicylate (ASPERCREME/ALOE) 10 % cream Apply 1 each topically as needed for Pain   Yes Historical Provider, MD   ciprofloxacin (CIPRO) 250 MG tablet Take 250 mg by mouth 2 times daily   Yes Historical Provider, MD   polyethylene glycol (GLYCOLAX) 17 GM/SCOOP powder Take 17 g by mouth daily as needed (Constipation)   Yes Historical Provider, MD   escitalopram (LEXAPRO) 20 MG tablet Take 20 mg by mouth daily   Yes Historical Provider, MD   haloperidol (HALDOL) 0.5 MG tablet Take 0.5 mg by mouth daily   Yes Historical Provider, MD   carboxymethylcellulose PF (REFRESH PLUS) 0.5 % SOLN ophthalmic solution Place 1 drop into both eyes as needed (Dry Eyes)   Yes Historical Provider, MD   sulfamethoxazole-trimethoprim (BACTRIM DS;SEPTRA DS) 800-160 MG per tablet Take 1 tablet by mouth 2 times daily   Yes Historical Provider, MD   diclofenac (VOLTAREN) 50 MG EC tablet Take 50 mg by mouth 3 times daily   Yes Historical Provider, MD   clopidogrel (PLAVIX) 75 MG tablet Take 75 mg by mouth every other day.    Yes Historical Provider, MD   Multiple Vitamins-Minerals (CENTRUM SILVER PO) Take 1 tablet by mouth daily  3/8/11  Yes Historical Provider, MD       Allergies: Allergies   Allergen Reactions    Zoloft [Sertraline Hcl]      Patient unsure of reaction       Social History:   Social History     Tobacco Use    Smoking status: Never Smoker    Smokeless tobacco: Never Used   Substance Use Topics    Alcohol use: No    Drug use: No       Past Surgical History:    Past Surgical History:   Procedure Laterality Date    APPENDECTOMY      EYE SURGERY Left 11/2016    cataract    FOOT SURGERY      HIP FRACTURE SURGERY      HYSTERECTOMY      KNEE ARTHROSCOPY      ROTATOR CUFF REPAIR      TONSILLECTOMY         Anticoagulant use:  No   Antiplatelet use:    Yes Plavix    NSAID use in last 72 hours: unknown  Taken PCN in past:  unknown  Last food/drink: unknown  Last tetanus: unknown    Family History:   Not pertinent to presenting problem. Complaints:   Head:  None  Neck:   None  Chest:   None  Back:   None  Abdomen:   None  Extremities:   None  Comments: confused/disoriented     Review of systems:  All negative unless otherwise noted. SECONDARY SURVEY  Head/scalp: Atraumatic    Face: Atraumatic    Eyes/ears/nose: Atraumatic    Pharynx/mouth: Atraumatic    Neck: Atraumatic     Cervical spine tenderness:   Cervical collar not indicated  Pain:  none  ROM:  Not indicated     Chest wall:  Atraumatic    Heart:  Regular rate & rhythm    Abdomen: Atraumatic. Soft ND  Tenderness:  none    Pelvis: Atraumatic  Tenderness: none    Thoracolumbar spine: Atraumatic  Tenderness:  none    Genitourinary:  Atraumatic. No blood or urine noted    Rectum: Atraumatic. No blood noted. Perineum: Atraumatic. No blood or urine noted.       Extremities:   Sensory normal  Motor normal    Distal Pulses  Left arm normal  Right arm normal  Left leg normal  Right leg normal    Capillary refill  Left arm normal  Right arm normal  Left leg normal  Right leg normal    Procedures in ED:  none    In the event of Emergency Blood Transfusion:  Due to the critical condition of this patient, I request the immediate release of blood products for emergency transfusion secondary to shock. I understand the increased risks incurred by the lack of complete transfusion testing. Radiology: Pelvic Xray , CT Head , CT Cervical spine , CT Chest  and CT Abdomen     Consultations:  Orthopedic surgery and Internal Medicine    Admission/Diagnosis: fall, hematuria, pubic rami fx, sacral ala fracture    Plan of Treatment:  Pan-scan ordered  Okay for regular diet  Monitor for occult injuries  appreciate urology/orthopaedic recommendations  Bowel regimen    Electronically signed by Virgie Singh MD on 9/17/2021 at 7:00 AM    Surgery Progress Note            Chief complaint:   Chief Complaint   Patient presents with    Fall     pt fell from a standing position.   pt has no complaints of pain and does not remember the fall due to her dementia      Patient Active Problem List   Diagnosis    Vasovagal near syncope    Tension headache    Tremor    TMJ (temporomandibular joint disorder)    Cervical muscle pain    Pubic ramus fracture, right, closed, initial encounter ([facility] [address]       S: as above    O:   Vitals:    09/17/21 0530   BP: 122/62   Pulse: 69   Resp: 14   Temp: 98.4 °F (36.9 °C)   SpO2: 95%     No intake or output data in the 24 hours ending 09/17/21 0726        Labs:  Lab Results   Component Value Date    WBC 6.1 09/17/2021    WBC 6.8 01/29/2021    WBC 10.8 09/05/2014    HGB 11.2 09/17/2021    HGB 11.5 01/29/2021    HGB 13.3 09/05/2014    HCT 32.5 09/17/2021    HCT 34.6 01/29/2021    HCT 38.4 09/05/2014     Lab Results   Component Value Date    CREATININE 1.0 09/17/2021    BUN 24 (H) 09/17/2021     09/17/2021    K 4.5 09/17/2021     09/17/2021    CO2 25 09/17/2021     No results found for: LIPASE, AMYLASE      Physical exam:   /62   Pulse 69   Temp 98.4 °F (36.9 °C) (Oral)   Resp 14   Ht 5' 6\" (1.676 m)   Wt 146 lb (66.2

## 2021-09-18 PROCEDURE — 6370000000 HC RX 637 (ALT 250 FOR IP): Performed by: STUDENT IN AN ORGANIZED HEALTH CARE EDUCATION/TRAINING PROGRAM

## 2021-09-18 PROCEDURE — 2580000003 HC RX 258: Performed by: FAMILY MEDICINE

## 2021-09-18 PROCEDURE — 6370000000 HC RX 637 (ALT 250 FOR IP): Performed by: FAMILY MEDICINE

## 2021-09-18 PROCEDURE — 1200000000 HC SEMI PRIVATE

## 2021-09-18 PROCEDURE — 99232 SBSQ HOSP IP/OBS MODERATE 35: CPT | Performed by: SURGERY

## 2021-09-18 PROCEDURE — 6360000002 HC RX W HCPCS: Performed by: FAMILY MEDICINE

## 2021-09-18 RX ADMIN — HALOPERIDOL 0.5 MG: 0.5 TABLET ORAL at 11:17

## 2021-09-18 RX ADMIN — CETIRIZINE HYDROCHLORIDE 10 MG: 10 TABLET, FILM COATED ORAL at 11:17

## 2021-09-18 RX ADMIN — POLYETHYLENE GLYCOL 3350 17 G: 17 POWDER, FOR SOLUTION ORAL at 11:16

## 2021-09-18 RX ADMIN — ACETAMINOPHEN 650 MG: 325 TABLET ORAL at 11:17

## 2021-09-18 RX ADMIN — ESCITALOPRAM OXALATE 20 MG: 10 TABLET ORAL at 11:16

## 2021-09-18 RX ADMIN — Medication 10 ML: at 21:13

## 2021-09-18 RX ADMIN — PANTOPRAZOLE SODIUM 40 MG: 40 TABLET, DELAYED RELEASE ORAL at 11:20

## 2021-09-18 RX ADMIN — ESCITALOPRAM OXALATE 20 MG: 10 TABLET ORAL at 11:18

## 2021-09-18 RX ADMIN — Medication 10 ML: at 11:20

## 2021-09-18 RX ADMIN — Medication 10 ML: at 00:10

## 2021-09-18 ASSESSMENT — PAIN DESCRIPTION - LOCATION: LOCATION: BACK

## 2021-09-18 ASSESSMENT — PAIN - FUNCTIONAL ASSESSMENT: PAIN_FUNCTIONAL_ASSESSMENT: PREVENTS OR INTERFERES SOME ACTIVE ACTIVITIES AND ADLS

## 2021-09-18 ASSESSMENT — PAIN SCALES - GENERAL
PAINLEVEL_OUTOF10: 3
PAINLEVEL_OUTOF10: 1

## 2021-09-18 ASSESSMENT — PAIN DESCRIPTION - FREQUENCY: FREQUENCY: INTERMITTENT

## 2021-09-18 ASSESSMENT — PAIN DESCRIPTION - PAIN TYPE: TYPE: ACUTE PAIN

## 2021-09-18 ASSESSMENT — ENCOUNTER SYMPTOMS
NAUSEA: 0
VOMITING: 0

## 2021-09-18 ASSESSMENT — PAIN DESCRIPTION - PROGRESSION: CLINICAL_PROGRESSION: GRADUALLY IMPROVING

## 2021-09-18 ASSESSMENT — PAIN DESCRIPTION - ONSET: ONSET: ON-GOING

## 2021-09-18 ASSESSMENT — PAIN DESCRIPTION - ORIENTATION: ORIENTATION: LOWER;MID

## 2021-09-18 ASSESSMENT — PAIN DESCRIPTION - DESCRIPTORS: DESCRIPTORS: DISCOMFORT;SORE

## 2021-09-18 NOTE — PROGRESS NOTES
Surgery Progress Note            Chief complaint:   Chief Complaint   Patient presents with    Fall     pt fell from a standing position.   pt has no complaints of pain and does not remember the fall due to her dementia      Patient Active Problem List   Diagnosis    Vasovagal near syncope    Tension headache    Tremor    TMJ (temporomandibular joint disorder)    Cervical muscle pain    Pubic ramus fracture, right, closed, initial encounter ([facility] [address]       S: no acute changes    O:   Vitals:    09/18/21 0450   BP: 130/70   Pulse: 91   Resp: 16   Temp: 98.4 °F (36.9 °C)   SpO2: 100%       Intake/Output Summary (Last 24 hours) at 9/18/2021 0855  Last data filed at 9/18/2021 0529  Gross per 24 hour   Intake 360 ml   Output 1850 ml   Net -1490 ml           Labs:  Lab Results   Component Value Date    WBC 6.8 09/17/2021    WBC 6.1 09/17/2021    WBC 6.8 01/29/2021    HGB 11.6 09/17/2021    HGB 11.2 09/17/2021    HGB 11.5 01/29/2021    HCT 33.7 09/17/2021    HCT 32.5 09/17/2021    HCT 34.6 01/29/2021     Lab Results   Component Value Date    CREATININE 0.8 09/17/2021    BUN 18 09/17/2021     09/17/2021    K 4.6 09/17/2021     09/17/2021    CO2 24 09/17/2021     No results found for: LIPASE, AMYLASE      Physical exam:   /70   Pulse 91   Temp 98.4 °F (36.9 °C)   Resp 16   Ht 5' 6\" (1.676 m)   Wt 146 lb (66.2 kg)   SpO2 100%   BMI 23.57 kg/m²   General appearance: NAD  Head: NCAT  Neck: supple, no masses  Lungs: equal chest rise bilateral  Heart: S1S2 present  Abdomen: soft, nontender, nondistended  Skin; no lesions  Gu: no cva tenderness  Extremities: extremities normal, atraumatic, no cyanosis or edema    A:  Fall with pelvic fractures, hematuria    P: pain control, Leo Mederos MD, MD  9/18/2021

## 2021-09-18 NOTE — PROGRESS NOTES
9/18/2021 8:35 AM  Service: Urology  Group: [facility] ([facility])    Tyler Mendez  [id number]    Chief urologic compliant: gross hematuria  HPI:  Patient is doing well     Review of Systems:  Respiratory: negative for cough and hemoptysis  Cardiovascular: negative for chest pain and dyspnea  Gastrointestinal: negative for abdominal pain, diarrhea, nausea and vomiting  Derm: negative for rash and skin lesion(s)  Neurological: negative for seizures and tremors  Endocrine: negative for diabetic symptoms including polydipsia and polyuria  : As above in the HPI, otherwise negative  All other reviews are negative     Allergies: Zoloft [sertraline hcl]    Objective:   Vitals:    09/18/21 0450   BP: 130/70   Pulse: 91   Resp: 16   Temp: 98.4 °F (36.9 °C)   SpO2: 100%       Neuro: A/A/O x3  Respiratory: non labored breathing  ABD: soft non-tender, non-distended  : love clear  Ext: no clubbing, cyanosis, edema    Labs:   Recent Labs     09/17/21  0013 09/17/21  0915   WBC 6.1 6.8   RBC 3.48* 3.56   HGB 11.2* 11.6   HCT 32.5* 33.7*   MCV 93.4 94.7   MCH 32.2 32.6   MCHC 34.5 34.4   RDW 12.7 12.5    272   MPV 9.3 9.5       Recent Labs     09/17/21  0013 09/17/21  0915   CREATININE 1.0 0.8       Assessment: Tyler Mendez 80 y.o. female     Gross Hematuria   UTI   Incontinence  Probable hypotonic bladder  Incomplete Bladder Emptying   Bilateral renal cyst    Plan:    Cont the love  Will need outpatient christofer Schumacher Vish, DO   [facility]

## 2021-09-18 NOTE — PROGRESS NOTES
Progress Note  Date:2021       Room:0321/0321-02  Patient Kylah Meza     YOB: 1926     Age:89 y.o. Subjective    Subjective:  Symptoms:  Stable. She reports malaise and weakness. Diet:  Adequate intake. No nausea or vomiting. Activity level: Impaired due to weakness. Pain:  She reports no pain. Review of Systems   Constitutional: Negative for fever. Gastrointestinal: Negative for nausea and vomiting. Neurological: Positive for weakness. All other systems reviewed and are negative. Objective         Vitals Last 24 Hours:  TEMPERATURE:  Temp  Av.3 °F (36.8 °C)  Min: 98.1 °F (36.7 °C)  Max: 98.4 °F (36.9 °C)  RESPIRATIONS RANGE: Resp  Av  Min: 16  Max: 16  PULSE OXIMETRY RANGE: SpO2  Av %  Min: 94 %  Max: 100 %  PULSE RANGE: Pulse  Av  Min: 75  Max: 91  BLOOD PRESSURE RANGE: Systolic (74EVE), NLV:834 , Min:129 , GAQ:868   ; Diastolic (84ETM), STEPHANI:52, Min:70, Max:89    I/O (24Hr): Intake/Output Summary (Last 24 hours) at 2021 1112  Last data filed at 2021 0529  Gross per 24 hour   Intake 360 ml   Output 1850 ml   Net -1490 ml     Objective:  General Appearance:  Ill-appearing. Vital signs: (most recent): Blood pressure 130/70, pulse 91, temperature 98.4 °F (36.9 °C), resp. rate 16, height 5' 6\" (1.676 m), weight 146 lb (66.2 kg), SpO2 100 %. Vital signs are normal.  No fever. Output: Producing urine and producing stool. HEENT: Normal HEENT exam.    Lungs:  Normal effort and normal respiratory rate. Heart: Normal rate. Regular rhythm. Abdomen: Abdomen is soft. Bowel sounds are normal.   There is no abdominal tenderness. Extremities: Decreased range of motion. Pulses: Distal pulses are intact. Neurological: Patient is alert. Skin:  Warm and dry.       Labs/Imaging/Diagnostics    Labs:  CBC:  Recent Labs     21  0013 21  0915   WBC 6.1 6.8   RBC 3.48* 3.56   HGB 11.2* 11.6   HCT 32.5* 33.7*   MCV 93.4 94.7   RDW 12.7 12.5    272     CHEMISTRIES:  Recent Labs     09/17/21  0013 09/17/21  0915    135   K 4.5 4.6    101   CO2 25 24   BUN 24* 18   CREATININE 1.0 0.8   GLUCOSE 105* 103*     PT/INR:No results for input(s): PROTIME, INR in the last 72 hours. APTT:No results for input(s): APTT in the last 72 hours. LIVER PROFILE:  Recent Labs     09/17/21  0013   AST 16   ALT 12   BILITOT 0.3   ALKPHOS 62       Imaging Last 24 Hours:  XR PELVIS (1-2 VIEWS)    Result Date: 9/16/2021  EXAMINATION: ONE XRAY VIEW OF THE PELVIS 9/16/2021 3:55 pm COMPARISON: None. HISTORY: ORDERING SYSTEM PROVIDED HISTORY: fall TECHNOLOGIST PROVIDED HISTORY: Reason for exam:->fall FINDINGS: There is mildly displaced and comminuted right superior pubic ramus fracture. Hardware in the visualized proximal right femur. Degenerative changes in the visualized lumbar spine. Comminuted right superior pubic ramus fracture. CT HEAD WO CONTRAST    Result Date: 9/16/2021  EXAMINATION: CT OF THE HEAD WITHOUT CONTRAST  9/16/2021 3:41 pm TECHNIQUE: CT of the head was performed without the administration of intravenous contrast. Dose modulation, iterative reconstruction, and/or weight based adjustment of the mA/kV was utilized to reduce the radiation dose to as low as reasonably achievable. COMPARISON: None. HISTORY: ORDERING SYSTEM PROVIDED HISTORY: Trauma TECHNOLOGIST PROVIDED HISTORY: Has a \"code stroke\" or \"stroke alert\" been called? ->No Reason for exam:->Trauma Decision Support Exception - unselect if not a suspected or confirmed emergency medical condition->Emergency Medical Condition (MA) FINDINGS: BRAIN/VENTRICLES: There is no acute intracranial hemorrhage, mass effect or midline shift. No abnormal extra-axial fluid collection. The gray-white differentiation is maintained without evidence of an acute infarct. There is no evidence of hydrocephalus.  The ventricles, cisterns and sulci are prominent consistent with atrophy. There is decreased attenuation within the periventricular white matter consistent with periventricular leukomalacia. ORBITS: The visualized portion of the orbits demonstrate no acute abnormality. SINUSES: The visualized paranasal sinuses and mastoid air cells demonstrate no acute abnormality. SOFT TISSUES/SKULL:  No acute abnormality of the visualized skull or soft tissues. 1. There is no acute intracranial abnormality. Specifically, there is no intracranial hemorrhage. 2. Advanced atrophy and periventricular leukomalacia,     CT CHEST W CONTRAST    Result Date: 9/17/2021  EXAMINATION: CT OF THE CHEST WITH CONTRAST 9/17/2021 5:26 am TECHNIQUE: CT of the chest was performed with the administration of intravenous contrast. Multiplanar reformatted images are provided for review. Dose modulation, iterative reconstruction, and/or weight based adjustment of the mA/kV was utilized to reduce the radiation dose to as low as reasonably achievable. COMPARISON: None. HISTORY: ORDERING SYSTEM PROVIDED HISTORY: trauma TECHNOLOGIST PROVIDED HISTORY: Reason for exam:->trauma FINDINGS: Mediastinum: There are coronary artery calcifications. There is no abnormal mediastinal or hilar mass seen. There is no abnormal mediastinal air or abnormal mediastinal fluid collection. Lungs/pleura: There is no acute infiltrate. There is no pleural effusion or pneumothorax. Upper Abdomen: No acute abnormality seen in visualized upper abdomen. Soft Tissues/Bones: There is pectus excavatum deformity of the anterior chest wall. No acute post-traumatic abnormality seen in the chest.     CT CERVICAL SPINE WO CONTRAST    Result Date: 9/17/2021  EXAMINATION: CT OF THE CERVICAL SPINE WITHOUT CONTRAST 9/17/2021 5:26 am TECHNIQUE: CT of the cervical spine was performed without the administration of intravenous contrast. Multiplanar reformatted images are provided for review.  Dose modulation, iterative reconstruction, and/or weight based adjustment of the mA/kV was utilized to reduce the radiation dose to as low as reasonably achievable. COMPARISON: Cervical spine CT May 4, 2019 HISTORY: ORDERING SYSTEM PROVIDED HISTORY: trauma TECHNOLOGIST PROVIDED HISTORY: Reason for exam:->trauma FINDINGS: BONES/ALIGNMENT: There is no acute fracture or traumatic malalignment. DEGENERATIVE CHANGES: No change in multilevel degenerative disc disease seen throughout the cervical spine. SOFT TISSUES: There is no prevertebral soft tissue swelling. No traumatic injuries of cervical spine     CT PELVIS WO CONTRAST Additional Contrast? None    Result Date: 9/16/2021  EXAMINATION: CT OF THE PELVIS WITHOUT CONTRAST 9/16/2021 5:19 pm TECHNIQUE: CT of the pelvis was performed without the administration of intravenous contrast.  Multiplanar reformatted images are provided for review. Adjustment of mA and/or kV according to patient size was utilized. Dose modulation, iterative reconstruction, and/or weight based adjustment of the mA/kV was utilized to reduce the radiation dose to as low as reasonably achievable. COMPARISON: AP pelvis 09/16/2021 at 1555 hours. HISTORY: ORDERING SYSTEM PROVIDED HISTORY: pubic ramus fracture TECHNOLOGIST PROVIDED HISTORY: Reason for exam:->pubic ramus fracture Additional Contrast?->None Decision Support Exception - unselect if not a suspected or confirmed emergency medical condition->Emergency Medical Condition (MA) FINDINGS: There is comminuted fracture of the right parasymphyseal pubis with comminuted, segmental fracture in the right inferior pubic ramus and comminuted fracture of the right superior pubic ramus. There is also vertically oriented, distracted fracture through the right sacral ala. There is minimally displaced fracture at the right L5 transverse process. There is degenerative disc disease at L4-5 and L5-S1 as well as bilateral facet arthropathy at both levels.   There is intramedullary nail and compression screw in the right femur. The urinary bladder is distended with mild thickening of the wall which may represent chronic cystitis. There is a large amount of stool throughout the visualized portions of the colon. There is colonic diverticulosis. There is atherosclerotic disease. 1. Comminuted fractures at the right parasymphyseal pubis as well as the superior and inferior pubic rami. 2. Longitudinal fracture involving the right sacral ala. 3. Minimally displaced fracture at the right L5 transverse process. CT ABDOMEN PELVIS W IV CONTRAST Additional Contrast? None    Result Date: 9/17/2021  EXAMINATION: CT OF THE ABDOMEN AND PELVIS WITH CONTRAST 9/17/2021 5:26 am TECHNIQUE: CT of the abdomen and pelvis was performed with the administration of intravenous contrast. Multiplanar reformatted images are provided for review. Dose modulation, iterative reconstruction, and/or weight based adjustment of the mA/kV was utilized to reduce the radiation dose to as low as reasonably achievable. COMPARISON: CT pelvis of 09/16/2021 HISTORY: ORDERING SYSTEM PROVIDED HISTORY: trauma TECHNOLOGIST PROVIDED HISTORY: Additional Contrast?->None Reason for exam:->trauma FINDINGS: Lower Chest: The visualized portions of the lung bases are clear. There is pectus excavatum deformity of the lower thorax. Organs: There are calcifications throughout pancreas which are consistent with prior episodes of pancreatitis. There no findings to suggest acute pancreatitis. The liver, spleen, adrenal glands and kidneys demonstrate no acute abnormality. There is a 5 cm right renal cyst.  There is no evidence for solid organ injury. GI/Bowel: There are no findings of intestinal obstruction. There is prominent amount of stool throughout the colon. Correlate for constipation. The appendix is not visualized. There is sigmoid diverticulosis. There no acute diverticulitis. Gallbladder appears grossly unremarkable. Pelvis:  There is a Germain catheter in the bladder. There is diffuse bladder wall thickening concerning for cystitis. Coronal images suggest possible mucosal mass along the right side. Bladder mass not excluded. There is additional hyperdensity in the bladder which is probably blood clots. Peritoneum/Retroperitoneum: There are aortoiliac atherosclerotic calcification. There is no abdominal aortic aneurysm. There is no free intraperitoneal air. There is no abnormal fluid collection. Bones/Soft Tissues: Prominent lumbar degenerative changes. There are displaced fractures of the right pubic rami. There is also fracture through the right sacral ala. There appears to be bony resorption along fracture margins suggesting that fractures are more likely subacute than acute. Correlate with history. There is a fracture of L5 right transverse process of unknown age, either acute or subacute. 1. No solid organ injury seen. 2. Pancreatic calcifications are consistent with the wall episodes of pancreatitis. There is no evidence for acute pancreatitis. 3. Large amount of stool throughout colon. Correlate constipation. 4. Sigmoid diverticulosis. No acute diverticulitis seen. 5. Abnormal bladder. There is diffuse wall thickening which could be cystitis. There is some focal mucosal thickening on the right side. A bladder mass not excluded. Hyperdensity in the bladder is probably blood clots. 6. Pelvic fractures including displaced right pubic rami fractures and fracture involving right sacral ala. There is bony resorption along the fracture margins suggesting that these are subacute. Correlate clinically. 7. L5 right transverse process fracture could be acute or subacute.      FL CYSTOGRAM MINIMUM 3 VW    Result Date: 9/17/2021  EXAMINATION: CYSTOGRAM 9/17/2021 2:04 pm COMPARISON: CT pelvis dated 09/17/2021 HISTORY: ORDERING SYSTEM PROVIDED HISTORY: rule out bladder injury TECHNOLOGIST PROVIDED HISTORY: Reason for exam:->rule out bladder injury Hematuria. History of fall with multiple pelvic fractures. FLUOROSCOPY DOSE AND TYPE OR TIME AND EXPOSURES: Fluoroscopy time 0.7 minutes. Air kerma 17.184 mGy FINDINGS:  view of the pelvis demonstrates multiple right-sided pelvic fractures. There has been prior ORIF of fracture of the proximal right femur. Multiple fluoroscopic images were obtained of turn study shin of water-soluble contrast through a Love catheter. The bladder distends fairly well. There is mild diffuse bladder wall trabeculation. No obvious intraluminal mass is seen. There is no evidence of extravasation of contrast to suggest bladder laceration or rupture. No GE reflux is noted. The post evacuation film shows near total emptying of the bladder. The Love catheter is in good position. 1. Bladder wall trabeculation. 2. Otherwise, unremarkable cystogram.  No evidence of a leak. Assessment//Plan           Hospital Problems         Last Modified POA    Pubic ramus fracture, right, closed, initial encounter ([facility] [address] 9/16/2021 Yes        Assessment:    Condition: In stable condition. (Pelvic fracture comminuted  Lumbar compression fracture  Gross hematuria  UTI  Debility  Dementia  ). Plan:   Encourage ambulation and ad angelika activity. Consults: physical therapy. Regular diet. (Continue current management  PT/OT  Continue ABX  Continue love    Will follow).        Electronically signed by Marie Mcdonald DO on 9/18/21 at 11:12 AM EDT

## 2021-09-18 NOTE — CONSULTS
Department of Orthopedic Surgery  Resident Consult Note          Reason for Consult: Pelvic fractures found on CT. HISTORY OF PRESENT ILLNESS:       Patient is a 80 y.o. female who presents with pelvic fractures found on CT scan and x-ray of the pelvis after an apparent fall at a facility. Patient has no complaints of pain at this time. She has been in the hospital for 2 days now and consult for Dr. Aydee Barroso was placed this a.m. Upon examination patient has baseline dementia and does not provide much in terms of history. Most of history is taken from nursing staff, daughter, chart. She currently denies any complaints. She has no numbness or tingling. According to the daughter she normally ambulates with a walker. She has seen an orthopedic surgeon in the past for a broken hip. She is on Plavix. This afternoon she is seen with nursing and a sitter at bedside. Per nursing she has been given Tylenol but has no complaints of pain.     Past Medical History:        Diagnosis Date    Anxiety     Bladder incontinence     Hyperlipidemia     Osteoarthritis     Sinus infection      Past Surgical History:        Procedure Laterality Date    APPENDECTOMY      EYE SURGERY Left 11/2016    cataract    FOOT SURGERY      HIP FRACTURE SURGERY      HYSTERECTOMY      KNEE ARTHROSCOPY      ROTATOR CUFF REPAIR      TONSILLECTOMY       Current Medications:   Current Facility-Administered Medications: Soothe XP Xtra Protection SOLN 1 drop, 1 drop, Both Eyes, PRN  polyethylene glycol (GLYCOLAX) packet 17 g, 17 g, Oral, BID  cefTRIAXone (ROCEPHIN) 1,000 mg in sterile water 10 mL IV syringe, 1,000 mg, IntraVENous, Q24H  acetaminophen (TYLENOL) tablet 650 mg, 650 mg, Oral, Q4H PRN  ondansetron (ZOFRAN-ODT) disintegrating tablet 4 mg, 4 mg, Oral, Q8H PRN **OR** ondansetron (ZOFRAN) injection 4 mg, 4 mg, IntraVENous, Q6H PRN  sodium chloride flush 0.9 % injection 5-40 mL, 5-40 mL, IntraVENous, 2 times per day  sodium chloride flush 0.9 % injection 5-40 mL, 5-40 mL, IntraVENous, PRN  0.9 % sodium chloride infusion, 25 mL, IntraVENous, PRN  bisacodyl (DULCOLAX) suppository 10 mg, 10 mg, Rectal, Daily  sodium phosphate (FLEET) enema 1 enema, 1 enema, Rectal, Daily PRN  morphine (PF) injection 2 mg, 2 mg, IntraVENous, Q2H PRN **OR** morphine injection 4 mg, 4 mg, IntraVENous, Q2H PRN  [Held by provider] enoxaparin (LOVENOX) injection 40 mg, 40 mg, SubCUTAneous, Daily  [Held by provider] clopidogrel (PLAVIX) tablet 75 mg, 75 mg, Oral, Every Other Day  escitalopram (LEXAPRO) tablet 20 mg, 20 mg, Oral, Daily  haloperidol (HALDOL) tablet 0.5 mg, 0.5 mg, Oral, Daily  cetirizine (ZYRTEC) tablet 10 mg, 10 mg, Oral, Daily  melatonin disintegrating tablet 5 mg, 5 mg, Oral, Nightly  pantoprazole (PROTONIX) tablet 40 mg, 40 mg, Oral, Daily  Allergies:  Zoloft [sertraline hcl]    Social History:   TOBACCO:   reports that she has never smoked. She has never used smokeless tobacco.  ETOH:   reports no history of alcohol use. DRUGS:   reports no history of drug use. ACTIVITIES OF DAILY LIVING:    OCCUPATION:    Family History:       Problem Relation Age of Onset    Heart Disease Mother     Heart Disease Father     High Blood Pressure Sister     High Cholesterol Sister     High Cholesterol Brother        REVIEW OF SYSTEMS:  Cannot be appropriately obtained at this time. PHYSICAL EXAM:    VITALS:  /70   Pulse 91   Temp 98.4 °F (36.9 °C)   Resp 16   Ht 5' 6\" (1.676 m)   Wt 146 lb (66.2 kg)   SpO2 98%   BMI 23.57 kg/m²   CONSTITUTIONAL: Awake, in no acute distress, does not respond to questions appropriately. MUSCULOSKELETAL:  Bilateral lower Extremity:  Negative logroll to bilateral lower extremities. She has no tenderness to palpation about the anterior or posterior pelvis. She has no grimace either. She does not follow commands fully however she will wiggle bilateral lower extremity toes.   She appears to have intact sensation.  -Heel strike  Skin intact with no signs of degloving  Compartments soft and compressible, calf non-tender  +DP & PT pulses, Toes warm and perfused  · + Wiggling of toes. Secondary Exam:   bilateralUE: No obvious signs of trauma. -TTP to fingers, hand, wrist, forearm, elbow, humerus, shoulder or clavicle. · Pelvis: -TTP, -Log roll, -Heel strike     DATA:    CBC:   Lab Results   Component Value Date    WBC 6.8 09/17/2021    RBC 3.56 09/17/2021    HGB 11.6 09/17/2021    HCT 33.7 09/17/2021    MCV 94.7 09/17/2021    MCH 32.6 09/17/2021    MCHC 34.4 09/17/2021    RDW 12.5 09/17/2021     09/17/2021    MPV 9.5 09/17/2021     PT/INR:    Lab Results   Component Value Date    PROTIME 11.8 01/29/2021    INR 1.0 01/29/2021     Lactic Acid : No results found for: [address]    Radiology Review:  09/18/21 - XR pelvis  Shows a right-sided superior pubic rami fracture is comminuted and displaced. CT pelvis  Redemonstration of right sided superior pubic ramus fracture that appears to be displaced as well as findings concerning for possible subacute versus pathologic. She also has a left-sided sacral fracture that appears subacute versus pathologic. There is lysis around her left-sided sacrum. IMPRESSION:   · Closed, Right LC 1 pelvis fracture-questionable chronicity    PLAN:  Partial weightbearing right lower extremity 50% with a walker. Pain control per primary  PT/OT - Gait training  The patient's pelvic fractures were reviewed with the patient's daughter as well as Dr. Jayson Toney. Currently she has no pain to palpation about her posterior or anterior pelvis. It is questionable whether these fractures are due to pathologic nature or subacute. Currently we do not recommend any invasive surgical procedures to stabilize the pelvis given her age as well as no pain currently. We will continue to follow her PT OT progression and adjust treatments as warranted. The daughter is in agreement to this.   Review

## 2021-09-19 LAB — URINE CULTURE, ROUTINE: NORMAL

## 2021-09-19 PROCEDURE — 1200000000 HC SEMI PRIVATE

## 2021-09-19 PROCEDURE — 2580000003 HC RX 258: Performed by: FAMILY MEDICINE

## 2021-09-19 PROCEDURE — 6370000000 HC RX 637 (ALT 250 FOR IP): Performed by: STUDENT IN AN ORGANIZED HEALTH CARE EDUCATION/TRAINING PROGRAM

## 2021-09-19 PROCEDURE — 97165 OT EVAL LOW COMPLEX 30 MIN: CPT

## 2021-09-19 PROCEDURE — 6360000002 HC RX W HCPCS: Performed by: FAMILY MEDICINE

## 2021-09-19 PROCEDURE — 97161 PT EVAL LOW COMPLEX 20 MIN: CPT | Performed by: PHYSICAL THERAPIST

## 2021-09-19 PROCEDURE — 97110 THERAPEUTIC EXERCISES: CPT | Performed by: PHYSICAL THERAPIST

## 2021-09-19 PROCEDURE — 99232 SBSQ HOSP IP/OBS MODERATE 35: CPT | Performed by: SURGERY

## 2021-09-19 PROCEDURE — 6370000000 HC RX 637 (ALT 250 FOR IP): Performed by: FAMILY MEDICINE

## 2021-09-19 RX ADMIN — Medication 5 MG: at 21:13

## 2021-09-19 RX ADMIN — HALOPERIDOL 0.5 MG: 0.5 TABLET ORAL at 13:59

## 2021-09-19 RX ADMIN — BISACODYL 10 MG: 10 SUPPOSITORY RECTAL at 08:55

## 2021-09-19 RX ADMIN — MORPHINE SULFATE 2 MG: 2 INJECTION, SOLUTION INTRAMUSCULAR; INTRAVENOUS at 14:32

## 2021-09-19 RX ADMIN — WATER 1000 MG: 1 INJECTION INTRAMUSCULAR; INTRAVENOUS; SUBCUTANEOUS at 14:00

## 2021-09-19 RX ADMIN — Medication 10 ML: at 21:18

## 2021-09-19 RX ADMIN — ESCITALOPRAM OXALATE 20 MG: 10 TABLET ORAL at 08:55

## 2021-09-19 RX ADMIN — CETIRIZINE HYDROCHLORIDE 10 MG: 10 TABLET, FILM COATED ORAL at 13:59

## 2021-09-19 RX ADMIN — CETIRIZINE HYDROCHLORIDE 10 MG: 10 TABLET, FILM COATED ORAL at 08:55

## 2021-09-19 RX ADMIN — ONDANSETRON 4 MG: 2 INJECTION INTRAMUSCULAR; INTRAVENOUS at 14:32

## 2021-09-19 RX ADMIN — POLYETHYLENE GLYCOL 3350 17 G: 17 POWDER, FOR SOLUTION ORAL at 21:13

## 2021-09-19 RX ADMIN — PANTOPRAZOLE SODIUM 40 MG: 40 TABLET, DELAYED RELEASE ORAL at 08:55

## 2021-09-19 RX ADMIN — POLYETHYLENE GLYCOL 3350 17 G: 17 POWDER, FOR SOLUTION ORAL at 08:55

## 2021-09-19 ASSESSMENT — PAIN DESCRIPTION - LOCATION: LOCATION: BACK

## 2021-09-19 ASSESSMENT — ENCOUNTER SYMPTOMS
VOMITING: 0
NAUSEA: 0
SHORTNESS OF BREATH: 0

## 2021-09-19 ASSESSMENT — PAIN DESCRIPTION - FREQUENCY: FREQUENCY: INTERMITTENT

## 2021-09-19 ASSESSMENT — PAIN DESCRIPTION - PROGRESSION: CLINICAL_PROGRESSION: GRADUALLY IMPROVING

## 2021-09-19 ASSESSMENT — PAIN SCALES - GENERAL
PAINLEVEL_OUTOF10: 2
PAINLEVEL_OUTOF10: 7

## 2021-09-19 ASSESSMENT — PAIN - FUNCTIONAL ASSESSMENT: PAIN_FUNCTIONAL_ASSESSMENT: ACTIVITIES ARE NOT PREVENTED

## 2021-09-19 ASSESSMENT — PAIN DESCRIPTION - DESCRIPTORS: DESCRIPTORS: ACHING;DISCOMFORT

## 2021-09-19 ASSESSMENT — PAIN DESCRIPTION - ORIENTATION: ORIENTATION: LOWER;MID

## 2021-09-19 ASSESSMENT — PAIN DESCRIPTION - PAIN TYPE: TYPE: CHRONIC PAIN

## 2021-09-19 ASSESSMENT — PAIN DESCRIPTION - ONSET: ONSET: GRADUAL

## 2021-09-19 NOTE — PROGRESS NOTES
Progress Note  Date:2021       Room:0321/0321-02  Patient Mark Buitrago     YOB: 1926     Age:89 y.o. Subjective    Subjective:  Symptoms:  Stable. She reports malaise and weakness. No shortness of breath, chest pain, chest pressure or anxiety. (Still pleasantly confused,  Proteus UTI as outpatient 3 days ago). Diet:  Adequate intake. No nausea or vomiting. Activity level: Impaired due to weakness. Pain:  She reports no pain. Review of Systems   Constitutional: Negative for fever. Respiratory: Negative for shortness of breath. Cardiovascular: Negative for chest pain. Gastrointestinal: Negative for nausea and vomiting. Neurological: Positive for weakness. All other systems reviewed and are negative. Objective         Vitals Last 24 Hours:  TEMPERATURE:  Temp  Av.3 °F (36.8 °C)  Min: 98.3 °F (36.8 °C)  Max: 98.3 °F (36.8 °C)  RESPIRATIONS RANGE: Resp  Av  Min: 17  Max: 17  PULSE OXIMETRY RANGE: SpO2  Av.5 %  Min: 93 %  Max: 98 %  PULSE RANGE: Pulse  Av  Min: 96  Max: 96  BLOOD PRESSURE RANGE: Systolic (40PRW), KAD:270 , Min:133 , ZNH:089   ; Diastolic (85DAN), KCO:29, Min:76, Max:76    I/O (24Hr): Intake/Output Summary (Last 24 hours) at 2021 0950  Last data filed at 2021 0545  Gross per 24 hour   Intake 650 ml   Output 1150 ml   Net -500 ml     Objective:  General Appearance:  Ill-appearing. Vital signs: (most recent): Blood pressure 133/76, pulse 96, temperature 98.3 °F (36.8 °C), temperature source Axillary, resp. rate 17, height 5' 6\" (1.676 m), weight 146 lb (66.2 kg), SpO2 93 %. Vital signs are normal.  No fever. Output: Producing urine and producing stool. HEENT: Normal HEENT exam.    Lungs:  Normal effort and normal respiratory rate. Heart: Normal rate. Regular rhythm. Abdomen: Abdomen is soft. Bowel sounds are normal.     Extremities: Decreased range of motion.     Neurological: Patient is alert.    Skin:  Warm and dry. Labs/Imaging/Diagnostics    Labs:  CBC:  Recent Labs     09/17/21 0013 09/17/21 0915   WBC 6.1 6.8   RBC 3.48* 3.56   HGB 11.2* 11.6   HCT 32.5* 33.7*   MCV 93.4 94.7   RDW 12.7 12.5    272     CHEMISTRIES:  Recent Labs     09/17/21 0013 09/17/21 0915    135   K 4.5 4.6    101   CO2 25 24   BUN 24* 18   CREATININE 1.0 0.8   GLUCOSE 105* 103*     PT/INR:No results for input(s): PROTIME, INR in the last 72 hours. APTT:No results for input(s): APTT in the last 72 hours. LIVER PROFILE:  Recent Labs     09/17/21 0013   AST 16   ALT 12   BILITOT 0.3   ALKPHOS 62       Imaging Last 24 Hours:  FL CYSTOGRAM MINIMUM 3 VW    Result Date: 9/17/2021  EXAMINATION: CYSTOGRAM 9/17/2021 2:04 pm COMPARISON: CT pelvis dated 09/17/2021 HISTORY: ORDERING SYSTEM PROVIDED HISTORY: rule out bladder injury TECHNOLOGIST PROVIDED HISTORY: Reason for exam:->rule out bladder injury Hematuria. History of fall with multiple pelvic fractures. FLUOROSCOPY DOSE AND TYPE OR TIME AND EXPOSURES: Fluoroscopy time 0.7 minutes. Air kerma 17.184 mGy FINDINGS:  view of the pelvis demonstrates multiple right-sided pelvic fractures. There has been prior ORIF of fracture of the proximal right femur. Multiple fluoroscopic images were obtained of turn study shin of water-soluble contrast through a Germain catheter. The bladder distends fairly well. There is mild diffuse bladder wall trabeculation. No obvious intraluminal mass is seen. There is no evidence of extravasation of contrast to suggest bladder laceration or rupture. No GE reflux is noted. The post evacuation film shows near total emptying of the bladder. The Germain catheter is in good position. 1. Bladder wall trabeculation. 2. Otherwise, unremarkable cystogram.  No evidence of a leak.      Assessment//Plan           Hospital Problems         Last Modified POA    [facility], right[facility] 9/16/2021 Yes        Assessment:    Condition: In stable condition. Unchanged. (Pelvic fracture  Proteus UTI  Debility  Gross hematuria). Plan:   Regular diet. (Continue abx   PT/OT  Discharge plan to snf).        Electronically signed by Alejandro Tucker DO on 9/19/21 at 9:50 AM EDT

## 2021-09-19 NOTE — PROGRESS NOTES
[address] [facility]  [facility] [place]. [place]        Date:2021                                                  Patient Name: Artur Catalan    MRN: [id number]    : 1926    Room: [facility]0321-02      Evaluating OT: Pricila Talbot OTR/L #[id number]     Referring Provider and Specific Provider Orders/Date:   21 1145  OT eval and treat Start: 21 1145, End: 21 1145, ONE TIME, Standing Count: 1 Occurrences, R      Rekha Reza, DO      Placement Recommendation: BILLY        Diagnosis:   1. Closed fracture of ramus of right pubis, initial encounter ([facility]    2. Pubic ramus fracture, right, closed, initial encounter ([facility]    3. Closed fracture of sacrum, unspecified portion of sacrum, initial encounter ([facility] [facility]    4. Closed fracture of transverse process of lumbar vertebra, initial encounter [facility])           Surgery: None this admission - no surgical intervention warranted at this time, per ortho surgery. Pertinent Medical History:       Past Medical History:   Diagnosis Date    Anxiety     Bladder incontinence     Hyperlipidemia     Osteoarthritis     Sinus infection          Past Surgical History:   Procedure Laterality Date    APPENDECTOMY      EYE SURGERY Left 2016    cataract    FOOT SURGERY      HIP FRACTURE SURGERY      HYSTERECTOMY      KNEE ARTHROSCOPY      ROTATOR CUFF REPAIR      TONSILLECTOMY          Precautions:  Fall Risk, partial weight bearing R LE (50%) with walker, dementia, 1:1 sitter.       Assessment of current deficits   [x] Functional mobility  [x]ADLs  [x] Strength               [x]Cognition    [x] Functional transfers   [x] IADLs         [x] Safety Awareness   [x]Endurance    [] Fine Coordination              [x] Balance      [] Vision/perception   []Sensation     []Gross Motor Coordination  [] ROM  [] Delirium                   [] Motor Control     OT PLAN OF CARE   OT POC based on physician orders, patient diagnosis and results of clinical assessment    Frequency/Duration 2-5 days/wk for 2 weeks PRN     Specific OT Treatment Interventions to include:   * Instruction/training on adapted ADL techniques and AE recommendations to increase functional independence within precautions       * Training on energy conservation strategies, correct breathing pattern and techniques to improve independence/tolerance for self-care routine  * Functional transfer/mobility training/DME recommendations for increased independence, safety, and fall prevention  * Patient/Family education to increase follow through with safety techniques and functional independence  * Recommendation of environmental modifications for increased safety with functional transfers/mobility and ADLs  * Cognitive retraining/development of therapeutic activities to improve problem solving, judgement, memory, and attention for increased safety/participation in ADL/IADL tasks  * Therapeutic exercise to improve motor endurance, ROM, and functional strength for ADLs/functional transfers  * Therapeutic activities to facilitate/challenge dynamic balance, stand tolerance for increased safety and independence with ADLs  * Positioning to improve skin integrity, interaction with environment and functional independence  * Delirium prevention/treatment    Recommended Adaptive Equipment: TBD     Home Living: Pt resides at an [address] [place] [address]. Bathroom set-up: Not reported    Equipment owned: wheeled walker, per chart review. Prior Level of Function: Requires assist with ADLs at baseline, Dependent with IADLs; ambulated with wheeled walker, per chart review. Pain Level: No signs of pain noted. Cognition: A&O: 1/4; Follows 1 step directions inconsistently. Pt is a poor historian and very confused.     Memory: poor   Sequencing: poor   Problem solving: poor   Judgement/safety: poor    [facility]   AM-PAC Daily Activity Inpatient   How much help for putting on and taking off regular lower body clothing?: Total  How much help for Bathing?: A Lot  How much help for Toileting?: Total  How much help for putting on and taking off regular upper body clothing?: A Lot  How much help for taking care of personal grooming?: A Lot  How much help for eating meals?: A Little  AM-PAC Inpatient Daily Activity Raw Score: 11  AM-PAC Inpatient ADL T-Scale Score : 29.04  ADL Inpatient CMS 0-100% Score: 70.42  ADL Inpatient CMS G-Code Modifier : CL     Functional Assessment:    Initial Eval Status  Date: 9/19/21 Treatment Status  Date: STGs = LTGs  Time frame: 10-14 days   Feeding Minimal Assist   Per staff report pt can feed self but requires assist cutting/setting up items on tray. Supervision    Grooming Moderate Assist   Pt able to bring hand to face with cloth but required mod-max cueing and assist for initiation and problem solving. Stand by Assist    UB Dressing Moderate Assist   Stand by Assist    LB Dressing Dependent   Minimal Assist    Bathing Maximal Assist   Minimal Assist    Toileting Dependent   Minimal Assist    Bed Mobility  Supine to sit: NT   Sit to supine: Maximal Assist   Supine to sit: Minimal Assist   Sit to supine: Minimal Assist    Functional Transfers Dependent   Low pivot transfer from chair to EOB. Moderate Assist    Functional Mobility NT due to pt overall debility, decreased activity tolerance, balance deficits, safety and fall risk.    Moderate Assist with wheeled walker   Balance Sitting:     Static: fair     Dynamic: fair -  Standing: NT  Sitting:     Static: fair +    Dynamic: fair +  Standing: fair  with walker   Activity Tolerance poor   Increase standing tolerance >1-2 minutes for improved engagement with functional transfers and indep in ADLs   Visual/  Perceptual Glasses: None seen at bedside      NA      Hand Dominance: Not reported      AROM (PROM) Strength Additional Info:    RUE  Difficult to formally assess d/t decreased command following. 2+/5      LUE Difficult to formally assess d/t decreased command following. 2+/5      Hearing:  WFL   Sensation:   No c/o numbness or tingling  Tone:  WFL   Edema: None observed     Comments: RN cleared patient for OT. Upon arrival patient in sitting in chair, sitter at bedside. Therapist facilitated and instructed pt on adapted  techniques & compensatory strategies to improve safety and independence with functional activities to allow pt to achieve highest level of independence and safely. Pt demonstrated poor understanding of education & follow through. At end of session, patient was returned to supine with sitter at bedside with call light and phone within reach, all lines and tubes intact. RN informed/aware of pt's restlessness upon return to bed. Overall, patient demonstrated  decreased independence and safety during completion of ADL tasks. Pt would benefit from continued skilled OT to increase safety and independence with completion of ADL tasks and functional mobility for improved quality of life. Rehab Potential: Good for established goals. Patient / Family Goal: Not reported  - family not at bedside     Patient and/or family were instructed on functional diagnosis, prognosis/goals and OT plan of care. Demonstrated poor understanding.      Eval Complexity: Low    Time In: 1:30 PM   Time Out: 1:45 PM    Total Treatment Time: 0       Min Units   OT Eval Low 97165  X  1    OT Eval Medium 41707      OT Eval High 11884      OT Re-Eval [id number]            ADL/Self Care 34043     Therapeutic Activities 85055       Therapeutic Ex 68670       Orthotic Management 89293       Manual [id number]     Neuro Re-Ed 21394       Non-Billable Time        Evaluation Time additionally includes thorough review of current medical information, gathering information on past medical history/social history and prior level of function, interpretation of standardized testing/informal observation of tasks, assessment of data and development of plan of care and goals.         Evaluating OT: Sadaf Reyes OTR/L #[id number]

## 2021-09-19 NOTE — PROGRESS NOTES
General Surgery Progress Note  Guilherme Pitts MD, MS    Patient's Name/Date of Birth: Katrin Barriga / 6/17/1926    Date: September 19, 2021     Surgeon: Beau Ramires MD    Chief Complaint: s/p fall    Patient Active Problem List   Diagnosis    Vasovagal near syncope    Tension headache    Tremor    TMJ (temporomandibular joint disorder)    Cervical muscle pain    Pubic ramus fracture, right, closed, initial encounter ([facility] [address]       Subjective: Nonverbal appears to be resting comfortably, discussion with in room support staff states patient's been eating well. Objective:  /76   Pulse 96   Temp 98.3 °F (36.8 °C) (Axillary)   Resp 17   Ht 5' 6\" (1.676 m)   Wt 146 lb (66.2 kg)   SpO2 93%   BMI 23.57 kg/m²   Labs:  Recent Labs     09/17/21  0013 09/17/21  0915   WBC 6.1 6.8   HGB 11.2* 11.6   HCT 32.5* 33.7*     Lab Results   Component Value Date    CREATININE 0.8 09/17/2021    BUN 18 09/17/2021     09/17/2021    K 4.6 09/17/2021     09/17/2021    CO2 24 09/17/2021     No results for input(s): LIPASE, AMYLASE in the last 72 hours.       General appearance:  NAD  Head: NCAT, PERRLA, EOMI, red conjunctiva  Neck: supple, no masses  Lungs: CTAB, equal chest rise bilateral  Heart: Reg rate  Abdomen: soft, nondistended, nontender  Skin; no lesions  Gu: no cva tenderness, Germain draining clear yellow urine  Extremities: extremities normal no evidence of hematoma      Assessment/Plan:  Katrin Barriga is a 80 y.o. female status post fall from with pelvic fractures hematuria    Diet as tolerated bowel regimen  PT OT discharge planning    Physician Signature: Electronically signed by Dr. Beau Ramires  [phone] (p)  9/19/2021  9:38 AM

## 2021-09-20 PROCEDURE — 2580000003 HC RX 258: Performed by: FAMILY MEDICINE

## 2021-09-20 PROCEDURE — 6370000000 HC RX 637 (ALT 250 FOR IP): Performed by: FAMILY MEDICINE

## 2021-09-20 PROCEDURE — 97110 THERAPEUTIC EXERCISES: CPT

## 2021-09-20 PROCEDURE — U0005 INFEC AGEN DETEC AMPLI PROBE: HCPCS

## 2021-09-20 PROCEDURE — U0003 INFECTIOUS AGENT DETECTION BY NUCLEIC ACID (DNA OR RNA); SEVERE ACUTE RESPIRATORY SYNDROME CORONAVIRUS 2 (SARS-COV-2) (CORONAVIRUS DISEASE [COVID-19]), AMPLIFIED PROBE TECHNIQUE, MAKING USE OF HIGH THROUGHPUT TECHNOLOGIES AS DESCRIBED BY CMS-2020-01-R: HCPCS

## 2021-09-20 PROCEDURE — 6360000002 HC RX W HCPCS: Performed by: FAMILY MEDICINE

## 2021-09-20 PROCEDURE — 97530 THERAPEUTIC ACTIVITIES: CPT

## 2021-09-20 PROCEDURE — 1200000000 HC SEMI PRIVATE

## 2021-09-20 PROCEDURE — 6370000000 HC RX 637 (ALT 250 FOR IP): Performed by: STUDENT IN AN ORGANIZED HEALTH CARE EDUCATION/TRAINING PROGRAM

## 2021-09-20 RX ADMIN — Medication 5 MG: at 20:43

## 2021-09-20 RX ADMIN — Medication 10 ML: at 21:47

## 2021-09-20 RX ADMIN — ESCITALOPRAM OXALATE 20 MG: 10 TABLET ORAL at 10:20

## 2021-09-20 RX ADMIN — POLYETHYLENE GLYCOL 3350 17 G: 17 POWDER, FOR SOLUTION ORAL at 10:19

## 2021-09-20 RX ADMIN — HALOPERIDOL 0.5 MG: 0.5 TABLET ORAL at 12:08

## 2021-09-20 RX ADMIN — WATER 1000 MG: 1 INJECTION INTRAMUSCULAR; INTRAVENOUS; SUBCUTANEOUS at 13:45

## 2021-09-20 RX ADMIN — PANTOPRAZOLE SODIUM 40 MG: 40 TABLET, DELAYED RELEASE ORAL at 12:08

## 2021-09-20 RX ADMIN — CETIRIZINE HYDROCHLORIDE 10 MG: 10 TABLET, FILM COATED ORAL at 10:20

## 2021-09-20 RX ADMIN — BISACODYL 10 MG: 10 SUPPOSITORY RECTAL at 10:20

## 2021-09-20 RX ADMIN — Medication 10 ML: at 10:58

## 2021-09-20 ASSESSMENT — PAIN SCALES - GENERAL
PAINLEVEL_OUTOF10: 0

## 2021-09-20 ASSESSMENT — ENCOUNTER SYMPTOMS
NAUSEA: 0
VOMITING: 0

## 2021-09-20 NOTE — PROGRESS NOTES
Physical Therapy    Physical Therapy Treatment Note/Plan of Care    Room #:  0321/0321-02  Patient Name: Maddi Bryson  YOB: 1926  MRN: [id number]    Date of Service: 9/20/2021     Tentative placement recommendation: Bravo Nelson with Physical Therapy   Equipment recommendation: To be determined      Evaluating Physical Therapist: Елена Mccullough, [address] [facility]  #[id number]      Specific Provider Orders/Date/Referring Provider :  09/16/21 2345   PT eval and treat Start: 09/16/21 2345, End: 09/16/21 2345, ONE TIME, Standing Count: 1 Occurrences, R    Anton Prieto, DO  Ortho consult: pwb 50% with walker  right-sided superior pubic rami fracture is comminuted and displaced.     Admitting Diagnosis:   Pubic ramus fracture, right, closed, initial encounter ([address] [S32.591A]  Closed fracture of transverse process of lumbar vertebra, initial encounter ([address] [S32.009A]  Closed fracture of ramus of right pubis, initial encounter ([address] [S32.591A]  Closed fracture of sacrum, unspecified portion of sacrum, initial encounter ([address] [S32.10XA]       Surgery: none  Visit Diagnoses       Codes    Closed fracture of ramus of right pubis, initial encounter [facility])    -  Primary S32.591A    Closed fracture of sacrum, unspecified portion of sacrum, initial encounter ([address]     S32.10XA    Closed fracture of transverse process of lumbar vertebra, initial encounter ([address]     S32.009A          Patient Active Problem List   Diagnosis    Vasovagal near syncope    Tension headache    Tremor    TMJ (temporomandibular joint disorder)    Cervical muscle pain    Pubic ramus fracture, right, closed, initial encounter ([address]        ASSESSMENT of Current Deficits Patient exhibits decreased strength, balance, endurance, range of motion and coordination impairing functional mobility, transfers, gait  and tolerance to activity are barriers to d/c and require skilled intervention during hospital stay to attain pre hospital level of function. Patient seems to be more confused today, no response to questions asked and working on folding and unfolding blanket on lap. Patient's daughter present. Tolerates supine exercises, however AAROM/PROM for all. Decreased strength, endurance and balance   increases patient's risk for fall. Pt fearflul of falling, hard of hearing and weak from prolonged bedrest. Will need skilled intervention to regain prior level of function      PHYSICAL THERAPY  PLAN OF CARE       Physical therapy plan of care is established based on physician order,  patient diagnosis and clinical assessment    Current Treatment Recommendations:    -Bed Mobility: Lower extremity exercises , Upper extremity exercises  and Trunk control activities   -Sitting Balance: Incorporate reaching activities to activate trunk muscles  and Hands on support to maintain midline   -Standing Balance: Perform strengthening exercises in standing to promote motor control with or without upper extremity support  and Instruct patient on adequate base of support to maintain balance  -Transfers: Provide instruction on proper hand and foot position for adequate transfer of weight onto lower extremities and use of gait device if needed and Cues for hand placement, technique and safety. Provide stabilization to prevent fall   -Gait: Gait training and Standing activities to improve: base of support, weight shift, weight bearing    -Endurance: Utilize Supervised activities to increase level of endurance to allow for safe functional mobility including transfers and gait     PT long term treatment goals are located in below grid    Patient and or family understand(s) diagnosis, prognosis, and plan of care. Frequency of treatments: Patient will be seen  daily.          Prior Level of Function: Patient ambulated with wheeled walker    Rehab Potential: good    for baseline    Past medical history:   Past Medical History:   Diagnosis Date    Anxiety     Bladder incontinence     Hyperlipidemia     Osteoarthritis     Sinus infection      Past Surgical History:   Procedure Laterality Date    APPENDECTOMY      EYE SURGERY Left 11/2016    cataract    FOOT SURGERY      HIP FRACTURE SURGERY      HYSTERECTOMY      KNEE ARTHROSCOPY      ROTATOR CUFF REPAIR      TONSILLECTOMY         SUBJECTIVE:    Precautions: , falls, alarm and dementia ,    Social history: Patient lives in an assisted living facility      Equipment owned: Carri Hamm,       [address] [facility]  Mobility Inpatient   How much difficulty turning over in bed?: A Lot  How much difficulty sitting down on / standing up from a chair with arms?: A Lot  How much difficulty moving from lying on back to sitting on side of bed?: A Lot  How much help from another person moving to and from a bed to a chair?: A Lot  How much help from another person needed to walk in hospital room?: Total  How much help from another person for climbing 3-5 steps with a railing?: Total  AM-PAC Inpatient Mobility Raw Score : 10  AM-PAC Inpatient T-Scale Score : 32.29  Mobility Inpatient CMS 0-100% Score: 76.75  Mobility Inpatient CMS G-Code Modifier : CL    Nursing cleared patient for PT treatment. Patient's daughter present during session. OBJECTIVE;   Initial Evaluation  Date: 9/16/2021 Treatment Date:  9/20/2021     Short Term/ Long Term   Goals   Was pt agreeable to Eval/treatment?  Yes yes To be met in 3 days   Pain level   0/10    No pain reported    Bed Mobility    Rolling: Maximal assist of 1    Supine to sit: Maximal assist of 1    Sit to supine: Maximal assist of 1    Scooting: Dependent assist of 1   Rolling: Not assessed    Supine to sit: Not assessed    Sit to supine: Not assessed    Scooting: Not assessed     Rolling: Minimal assist of 1    Supine to sit: Minimal assist of 1    Sit to supine: Minimal assist of 1    Scooting: Minimal assist of 1     Transfers Sit to stand: Dependent assist of 1 x 2 reps Sit to stand: Not assessed  not appropriate d/t patient being more confused and unable to answer questions     Sit to stand: Moderate assist of 1     Ambulation    not assessed  not assessed     15 feet using  wheeled walker with Moderate assist of 1    ROM Within functional limits    Increase range of motion 10% of affected joints    Strength BUE:  refer to OT eval  RLE:   3-/5  LLE:  3-/5  Increase strength in affected mm groups by 1/3 grade   Balance Sitting EOB:  poor    Dynamic Standing:  poor   Sitting EOB: not assessed   Dynamic Standing: not assessed    Sitting EOB:  fair     Dynamic Standing: fair       Patient is Alert & Oriented x person and follows one step directions  Intermittently impaired by hard of hearing and demenita  Sensation:  Patient  denies numbness/tingling   Edema:  no   Endurance: poor         Patient education  Patient educated on role of Physical Therapy, risks of immobility, safety and plan of care and  importance of mobility while in hospital      Patient response to education:   Pt verbalized understanding Pt demonstrated skill Pt requires further education in this area   No No Yes      Treatment:  Patient practiced and was instructed/facilitated in the following treatment: Patient assisted with supine exercises as below. Therapeutic Exercises:  ankle pumps, heel slide, hip abduction/adduction and straight leg raise  x 10-15 reps. At end of session, patient in bed with daughter present call light and phone within reach,  all lines and tubes intact, nursing notified. Patient would benefit from continued skilled Physical Therapy to improve functional independence and quality of life.          Patient's/ family goals   home    Time in  1028  Time out  1047    Total Treatment Time  19 minutes    CPT codes:  Therapeutic exercises (10952)   19 minutes  1 unit(s)    Marilynn Tirado, [facility]  #[id number]

## 2021-09-20 NOTE — CARE COORDINATION
ALEJANDRO note:  accepted at [facility]. patient will NEED PRECERT, a neg covid test, a completed HENS by SW/ALEJANDRO and a completed/signed FOX by physician.   Electronically signed by Violette Lombardo RN on 9/20/2021 at 4:19 PM

## 2021-09-20 NOTE — CARE COORDINATION
ALEJANDRO note: Met with patient's daughter, Virginia Tang, she is requesting a referral be made to [address] [place] [address],[address] facility. Referral called to [facility], await review/determination. If accepted at [facility] patient will NEED PRECERT, a neg covid test, a completed HENS by SW/CM and a completed/signed FOX by physician.

## 2021-09-20 NOTE — PROGRESS NOTES
Progress Note  Date:2021       Room:0321/0321-02  Patient Rajni Catalan     YOB: 1926     Age:89 y.o. Subjective    Subjective:  Symptoms:  Stable. She reports malaise and weakness. (Somolent today,  Proteus UTI POA from outpatient culture. Culture here pending. Continue Conde, waiting for Gillettes). Diet:  Adequate intake. No nausea or vomiting. Activity level: Impaired due to weakness. Pain:  She reports no pain. Review of Systems   Constitutional: Negative for fever. Gastrointestinal: Negative for nausea and vomiting. Neurological: Positive for weakness. All other systems reviewed and are negative. Objective         Vitals Last 24 Hours:  TEMPERATURE:  Temp  Av.6 °F (36.4 °C)  Min: 97.5 °F (36.4 °C)  Max: 97.6 °F (36.4 °C)  RESPIRATIONS RANGE: Resp  Av  Min: 18  Max: 18  PULSE OXIMETRY RANGE: SpO2  Av.7 %  Min: 94 %  Max: 97 %  PULSE RANGE: Pulse  Av.3  Min: 95  Max: 112  BLOOD PRESSURE RANGE: Systolic (36LUH), LTR:780 , Min:125 , WN   ; Diastolic (42JTV), LJB:00, Min:57, Max:65    I/O (24Hr): Intake/Output Summary (Last 24 hours) at 2021 1345  Last data filed at 2021 1241  Gross per 24 hour   Intake 350 ml   Output 875 ml   Net -525 ml     Objective:  General Appearance:  Ill-appearing. Vital signs: (most recent): Blood pressure (!) 125/57, pulse 95, temperature 97.5 °F (36.4 °C), temperature source Oral, resp. rate 18, height 5' 6\" (1.676 m), weight 146 lb (66.2 kg), SpO2 96 %. Vital signs are normal.  No fever. Output: Producing urine and producing stool. HEENT: Normal HEENT exam.    Lungs:  Normal effort and normal respiratory rate. Heart: Normal rate. Regular rhythm. Abdomen: Abdomen is soft. Bowel sounds are normal.   There is no abdominal tenderness. Extremities: Decreased range of motion. Neurological: Patient is alert. Skin:  Warm and dry.       Labs/Imaging/Diagnostics

## 2021-09-20 NOTE — DISCHARGE INSTR - COC
Continuity of Care Form    Patient Name: Autumn Seals   :  1926  MRN:  [id number]    Admit date:  2021  Discharge date:  2021    Code Status Order: Full Code   Advance Directives:      Admitting Physician:  Vadim Lott DO  PCP: Dago Calderón DO    Discharging Nurse: [facility] Unit/Room#: 0321/0321-02  Discharging Unit Phone Number: [phone]    Emergency Contact:   Extended Emergency Contact Information  Primary Emergency Contact: [facility] [address]  Address: [address] Phone: [phone]  Mobile Phone: [phone]  Relation: Child  Secondary Emergency Contact: [facility] Phone: [phone]  Relation: Other    Past Surgical History:  Past Surgical History:   Procedure Laterality Date    APPENDECTOMY      EYE SURGERY Left 2016    cataract    FOOT SURGERY      HIP FRACTURE SURGERY      HYSTERECTOMY      KNEE ARTHROSCOPY      ROTATOR CUFF REPAIR      TONSILLECTOMY         Immunization History: There is no immunization history on file for this patient.     Active Problems:  Patient Active Problem List   Diagnosis Code    Vasovagal near syncope R55    Tension headache G44.209    Tremor R25.1    TMJ (temporomandibular joint disorder) M26.609    Cervical muscle pain M54.2    Pubic ramus fracture, right, closed, initial encounter ([facility] [address] S32.591A       Isolation/Infection:   Isolation            No Isolation          Patient Infection Status       Infection Onset Added Last Indicated Last Indicated By Review Planned Expiration Resolved Resolved By    None active    Resolved    COVID-19 Rule Out 21 COVID-19 (Ordered)   21 Rule-Out Test Resulted            Nurse Assessment:  Last Vital Signs: BP (!) 125/57   Pulse 95   Temp 97.5 °F (36.4 °C) (Oral)   Resp 18   Ht 5' 6\" (1.676 m)   Wt 146 lb (66.2 kg)   SpO2 96%   BMI 23.57 kg/m²     Last documented pain score (0-10 scale): Pain Level: 0  Last Weight:   Wt Readings from Last 1 Encounters:   21 146 lb (66.2 kg)     Mental Status:  disoriented and alert    IV Access:  - None    Nursing Mobility/ADLs:  Walking   Assisted  Transfer  Assisted  Bathing  Assisted  Dressing  Assisted  Toileting  Assisted  Feeding  Independent  Med Admin  Assisted  Med Delivery   whole    Wound Care Documentation and Therapy:        Elimination:  Continence:   · Bowel: No  · Bladder: No and Germain  Urinary Catheter: Insertion Date: 2021 and Indication for Use of Catheter: Urology/Urologist seeing this patient or inserted indwelling catheter   Colostomy/Ileostomy/Ileal Conduit: No       Date of Last BM: 2021    Intake/Output Summary (Last 24 hours) at 2021 0915  Last data filed at 2021 0774  Gross per 24 hour   Intake 350 ml   Output 1375 ml   Net -1025 ml     I/O last 3 completed shifts: In: 300 [P.O.:300]  Out: 1375 [Urine:1375]    Safety Concerns:     History of Falls (last 30 days)    Impairments/Disabilities:      Vision    Nutrition Therapy:  Current Nutrition Therapy:   - Oral Diet:  General    Routes of Feeding: Oral  Liquids: Thin Liquids  Daily Fluid Restriction: no  Last Modified Barium Swallow with Video (Video Swallowing Test): not done    Treatments at the Time of Hospital Discharge:   Respiratory Treatments: ***  Oxygen Therapy:  is not on home oxygen therapy.   Ventilator:    - No ventilator support    Rehab Therapies: {THERAPEUTIC INTERVENTION:[id number]}  Weight Bearing Status/Restrictions: [facility] Weight Bearin}  Other Medical Equipment (for information only, NOT a DME order):  walker  Other Treatments: ***    Patient's personal belongings (please select all that are sent with patient):  Glasses    RN SIGNATURE:  Electronically signed by Luis F Gibbons RN on 21 at 3:59 PM EDT    CASE MANAGEMENT/SOCIAL WORK SECTION    Inpatient Status Date: ***    Readmission Risk Assessment Score:  Readmission Risk              Risk of Unplanned Readmission:  8           Discharging to Facility/ Agency   · [facility]: [facility] [address]         Phone: [phone]       Fax: [phone]        ·     Dialysis Facility (if applicable)   · Name:  · Address:  · Dialysis Schedule:  · Phone:  · Fax:    / signature: Electronically signed by Rylan Abdi RN on 9/20/21 at 4:20 PM EDT    PHYSICIAN SECTION    Prognosis: Good    Condition at Discharge: Stable    Rehab Potential (if transferring to Rehab): Good    Recommended Labs or Other Treatments After Discharge: ***    Physician Certification: I certify the above information and transfer of [place] [facility]  is necessary for the continuing treatment of the diagnosis listed and that she requires [place] for less 30 days.      Update Admission H&P: No change in H&P    PHYSICIAN SIGNATURE:  Electronically signed by Brittni Mayer DO on 9/21/21 at 3:52 PM EDT

## 2021-09-20 NOTE — PROGRESS NOTES
9/20/2021 10:17 AM  Service: Urology  Group: [facility] (Vish/Brandon/Randall)    Tanisha Alcaraz  [id number]    Subjective:    She is awake and alert  Her daughter is at her bedside  She is confused  Her Germain catheter is draining yellow urine    Review of Systems  Unable to obtain due to confusion      Scheduled Meds:   polyethylene glycol  17 g Oral BID    cefTRIAXone (ROCEPHIN) IV  1,000 mg IntraVENous Q24H    sodium chloride flush  5-40 mL IntraVENous 2 times per day    bisacodyl  10 mg Rectal Daily    [Held by provider] enoxaparin  40 mg SubCUTAneous Daily    [Held by provider] clopidogrel  75 mg Oral Every Other Day    escitalopram  20 mg Oral Daily    haloperidol  0.5 mg Oral Daily    cetirizine  10 mg Oral Daily    melatonin  5 mg Oral Nightly    pantoprazole  40 mg Oral Daily       Objective:  Vitals:    09/20/21 0730   BP:    Pulse: 95   Resp:    Temp:    SpO2: 96%         Allergies: Zoloft [sertraline hcl]    General Appearance: Awake and alert, confused, no acute distress  Skin: no rash or erythema  Head: normocephalic and atraumatic  Pulmonary/Chest: normal air movement, no respiratory distress  Abdomen: soft, non-tender, non-distended  Genitourinary: Germain catheter draining yellow urine  Extremities: no cyanosis, clubbing or edema         Labs:     No results for input(s): NA, K, CL, CO2, BUN, CREATININE, GLUCOSE, CALCIUM in the last 72 hours.     Lab Results   Component Value Date    HGB 11.6 09/17/2021    HCT 33.7 09/17/2021       No results found for: PSA    Narrative   EXAMINATION:   CYSTOGRAM       9/17/2021 2:04 pm       COMPARISON:   CT pelvis dated 09/17/2021       HISTORY:   ORDERING SYSTEM PROVIDED HISTORY: rule out bladder injury   TECHNOLOGIST PROVIDED HISTORY:   Reason for exam:->rule out bladder injury       Hematuria.  History of fall with multiple pelvic fractures.       FLUOROSCOPY DOSE AND TYPE OR TIME AND EXPOSURES:   Fluoroscopy time 0.7 minutes.  Air kerma 17.184 mGy     FINDINGS:    view of the pelvis demonstrates multiple right-sided pelvic fractures. There has been prior ORIF of fracture of the proximal right femur.       Multiple fluoroscopic images were obtained of turn study shin of   water-soluble contrast through a Germain catheter. The bladder distends fairly   well. There is mild diffuse bladder wall trabeculation. No obvious   intraluminal mass is seen. There is no evidence of extravasation of contrast   to suggest bladder laceration or rupture. No GE reflux is noted.       The post evacuation film shows near total emptying of the bladder. The Germain   catheter is in good position.           Impression   1. Bladder wall trabeculation.    2. Otherwise, unremarkable cystogram.  No evidence of a leak           Assessment/Plan:  Gross Hematuria (resolved)   UTI   Incontinence  Probable hypotonic bladder  Incomplete Bladder Emptying   Bilateral renal cyst    Cystogram unremarkable for any bladder injury or contrast extravasation  Gross hematuria most likely from rapid decompression of the bladder  Hematuria has currently resolved  Urine culture no growth present  Antibiotics per primary  Creatinine stable  Hemoglobin stable  Continue the Germain catheter  She will undergo voiding trial as an outpatient in rehab  There are no further acute interventions planned at this time  Please call with further questions or concerns      Eden Han, JAIDEN - CNP   [facility]

## 2021-09-20 NOTE — PROGRESS NOTES
[]? Motor Control      OT PLAN OF CARE   OT POC based on physician orders, patient diagnosis and results of clinical assessment     Frequency/Duration 2-5 days/wk for 2 weeks PRN      Specific OT Treatment Interventions to include:   * Instruction/training on adapted ADL techniques and AE recommendations to increase functional independence within precautions       * Training on energy conservation strategies, correct breathing pattern and techniques to improve independence/tolerance for self-care routine  * Functional transfer/mobility training/DME recommendations for increased independence, safety, and fall prevention  * Patient/Family education to increase follow through with safety techniques and functional independence  * Recommendation of environmental modifications for increased safety with functional transfers/mobility and ADLs  * Cognitive retraining/development of therapeutic activities to improve problem solving, judgement, memory, and attention for increased safety/participation in ADL/IADL tasks  * Therapeutic exercise to improve motor endurance, ROM, and functional strength for ADLs/functional transfers  * Therapeutic activities to facilitate/challenge dynamic balance, stand tolerance for increased safety and independence with ADLs  * Positioning to improve skin integrity, interaction with environment and functional independence  * Delirium prevention/treatment     Recommended Adaptive Equipment: TBD      Home Living: Pt resides at [address] Bathroom set-up: Not reported     Equipment owned: wheeled walker, per chart review.      Prior Level of Function: Requires assist with ADLs at baseline, Dependent with IADLs; ambulated with wheeled walker, per chart review.      Pain Level: Facial grimmacing noted with movement of R LE; nsg aware       Cognition: A&O: 1/4; Pt demo's difficulty with following through with tasks d/t lethargy. Pt is a poor historian and very confused. Memory: poor              Sequencing: poor              Problem solving: poor              Judgement/safety: poor     [facility]   AM-PAC Daily Activity Inpatient   How much help for putting on and taking off regular lower body clothing?: Total  How much help for Bathing?: A Lot  How much help for Toileting?: Total  How much help for putting on and taking off regular upper body clothing?: A Lot  How much help for taking care of personal grooming?: A Lot  How much help for eating meals?: A Little  AM-PAC Inpatient Daily Activity Raw Score: 11  AM-PAC Inpatient ADL T-Scale Score : 29.04  ADL Inpatient CMS 0-100% Score: 70.42  ADL Inpatient CMS G-Code Modifier : CL                Functional Assessment:     Initial Eval Status  Date: 9/19/21 Treatment Status  Date:9/21/2021 STGs = LTGs  Time frame: 10-14 days   Feeding Minimal Assist   Per staff report pt can feed self but requires assist cutting/setting up items on tray. Daughter assisted pt in eating lunch; pt able to bring spoon to mouth Supervision    Grooming Moderate Assist   Pt able to bring hand to face with cloth but required mod-max cueing and assist for initiation and problem solving.   N/T  Stand by Assist    UB Dressing Moderate Assist  Mod A to tie/adjust back of gown Stand by Assist    LB Dressing Dependent  N/T; pt had socks donned prior to session  Minimal Assist    Bathing Maximal Assist  N/T; pt had assist for bathing prior to session  Minimal Assist    Toileting Dependent   Dep - love catheter care Minimal Assist    Bed Mobility  Supine to sit: NT   Sit to supine: Maximal Assist  Max A/Dep for supine to sit; max A for scooting; max A/Dep for sit to supine; assist to guide UB and LE's to/from supine; max A to scoot to [facility] with bed in trendelenburg position and use of chux pad; max A for side rolling to place TAPS system; positioning under R LE on RTB  Supine to sit: Minimal Assist   Sit to supine: Minimal Assist    Functional Transfers Dependent   Low pivot transfer from chair to EOB. N/T d/t poor sitting balance at EOB with max A to maintain upright position d/t strong R lateral lean  Moderate Assist    Functional Mobility NT due to pt overall debility, decreased activity tolerance, balance deficits, safety and fall risk. NT due to pt overall debility, decreased activity tolerance, balance deficits, safety and fall risk.   Moderate Assist with wheeled walker   Balance Sitting:     Static: fair     Dynamic: fair -  Standing: NT Sitting:     Static: fair     Dynamic: fair -/poor with max A for upright position d/t strong R lateral lean  Standing: NT  Sitting:     Static: fair +    Dynamic: fair +  Standing: fair  with walker   Activity Tolerance poor   poor Increase standing tolerance >1-2 minutes for improved engagement with functional transfers and indep in ADLs   Visual/  Perceptual Glasses: None seen at bedside       NA       Hand Dominance: Not reported        AROM (PROM) Strength Additional Info:    RUE  Difficult to formally assess d/t decreased command following. 2+/5        LUE Difficult to formally assess d/t decreased command following. 2+/5      - BUE PROM exercises: 15 reps in all planes of movement to increase ROM/endurance required for functional transfers/ADL participation. Exercises completed in shoulder and elbow flexion/extension, internal/external rotation, abduction/adduction, supination/pronation, digit and wrist flexion/extension. B UE ROM appears to be limited in all planes with PROM ex's on RTB with HOB elevated. Trunk Ex's: 10 x trunk flexion/extension and 5 x in R/L lateral flexion with Max A in attempts to increase sitting balance d/t strong R lateral lean. Hearing:  WFL   Sensation:   No c/o numbness or tingling  Tone:  WFL   Edema: None observed       Comments: Patient cleared by nursing staff. Upon arrival pt supine in bed with R LE externally rotated and flexed at knee. Daughter present. Pt agreeable to OT tx session. Pt educated with regards to bed mobility, hand placement, safety awareness, static sitting balance,  B UE ROM ex's, ECT's. At end of session pt supine in bed with HOB elevated, positioning provided; all lines and tubes intact, call light within reach. Overall, pt demonstrated decreased independence and safety during completion of ADL/functional transfers/mobility tasks. Pt would benefit from continued skilled OT to increase safety and independence with completion of ADL/IADL tasks for functional independence and quality of life. Daughter states pt is totally different than yesterday, as she is not really talking a lot today. Positioning provided d/t external rotation of R LE. Pt required cues and education as noted above for safe facilitation and completion of tasks. Therapist provided skilled monitoring of patient's response during treatment session. Prior to and at the end of session, environmental modifications/ line management completed for patients safety and efficiency of treatment session. Overall, patient demonstrates moderate difficulties with completion of BADLs and IADLs. Factors contributing to these difficulties include pain, decreased endurance, and generalized weakness. As noted above, patient likely to benefit from further OT intervention to increase independence, safety, and overall quality of life. Treatment:     ? Bed mobility: Facilitated bed mobility with cues for proper body mechanics and sequencing to prepare for ADL completion. ? ADL completion: Self-care retraining for the above-mentioned ADLs; training on proper hand placement, safety technique, sequencing, and energy conservation techniques. ? Postural Balance: Sitting balance retraining to improve righting reactions with postural changes during ADLs. ? Skilled positioning: Proper positioning to improve interaction with environment, overall functioning   ?  Therapeutic Ex's: To increase B UE strength/ROM/endurance required for functional transfers and sitting balance for ADL participation. · Pt has made fair minus progress towards set goals    · OT 1-3x/week for 5-7 days during hospitalization       Treatment Time also includes thorough review of current medical information, gathering information on past medical history/social history and prior level of function, informal observation of tasks, assessment of data and education on plan of care and goals.     Treatment Time In:1:05 PM    Treatment Time Out: 1:31 PM         Treatment Charges: Mins Units   ADL/Home Mgt     [id number]     Thera Activities     58602 10 1   Ther Ex                 [id number] 16 1   Manual Therapy    [id number]     Neuro Re-ed         00392     Orthotic manage/training                               79602     Non Billable Time     Total Timed Treatment 26 [address] [facility], SHEETS/L #[id number]

## 2021-09-21 ENCOUNTER — APPOINTMENT (OUTPATIENT)
Dept: GENERAL RADIOLOGY | Age: 86
DRG: 689 | End: 2021-09-21
Payer: MEDICARE

## 2021-09-21 ENCOUNTER — APPOINTMENT (OUTPATIENT)
Dept: CT IMAGING | Age: 86
DRG: 689 | End: 2021-09-21
Payer: MEDICARE

## 2021-09-21 LAB
ANION GAP SERPL CALCULATED.3IONS-SCNC: 9 MMOL/L (ref 7–16)
BASOPHILS ABSOLUTE: 0.01 E9/L (ref 0–0.2)
BASOPHILS RELATIVE PERCENT: 0.1 % (ref 0–2)
BUN BLDV-MCNC: 16 MG/DL (ref 6–23)
CALCIUM SERPL-MCNC: 9 MG/DL (ref 8.6–10.2)
CHLORIDE BLD-SCNC: 100 MMOL/L (ref 98–107)
CO2: 25 MMOL/L (ref 22–29)
CREAT SERPL-MCNC: 0.6 MG/DL (ref 0.5–1)
EOSINOPHILS ABSOLUTE: 0.02 E9/L (ref 0.05–0.5)
EOSINOPHILS RELATIVE PERCENT: 0.1 % (ref 0–6)
GFR AFRICAN AMERICAN: >60
GFR NON-AFRICAN AMERICAN: >60 ML/MIN/1.73
GLUCOSE BLD-MCNC: 138 MG/DL (ref 74–99)
HCT VFR BLD CALC: 34.1 % (ref 34–48)
HEMOGLOBIN: 11.8 G/DL (ref 11.5–15.5)
IMMATURE GRANULOCYTES #: 0.08 E9/L
IMMATURE GRANULOCYTES %: 0.6 % (ref 0–5)
LACTIC ACID: 1.5 MMOL/L (ref 0.5–2.2)
LYMPHOCYTES ABSOLUTE: 1.88 E9/L (ref 1.5–4)
LYMPHOCYTES RELATIVE PERCENT: 13.5 % (ref 20–42)
MCH RBC QN AUTO: 32.3 PG (ref 26–35)
MCHC RBC AUTO-ENTMCNC: 34.6 % (ref 32–34.5)
MCV RBC AUTO: 93.4 FL (ref 80–99.9)
METER GLUCOSE: 131 MG/DL (ref 74–99)
MONOCYTES ABSOLUTE: 0.84 E9/L (ref 0.1–0.95)
MONOCYTES RELATIVE PERCENT: 6 % (ref 2–12)
NEUTROPHILS ABSOLUTE: 11.12 E9/L (ref 1.8–7.3)
NEUTROPHILS RELATIVE PERCENT: 79.7 % (ref 43–80)
PDW BLD-RTO: 12.6 FL (ref 11.5–15)
PLATELET # BLD: 307 E9/L (ref 130–450)
PMV BLD AUTO: 9 FL (ref 7–12)
POTASSIUM SERPL-SCNC: 4.4 MMOL/L (ref 3.5–5)
RBC # BLD: 3.65 E12/L (ref 3.5–5.5)
SODIUM BLD-SCNC: 134 MMOL/L (ref 132–146)
WBC # BLD: 14 E9/L (ref 4.5–11.5)

## 2021-09-21 PROCEDURE — 97535 SELF CARE MNGMENT TRAINING: CPT

## 2021-09-21 PROCEDURE — 87040 BLOOD CULTURE FOR BACTERIA: CPT

## 2021-09-21 PROCEDURE — 36415 COLL VENOUS BLD VENIPUNCTURE: CPT

## 2021-09-21 PROCEDURE — 71045 X-RAY EXAM CHEST 1 VIEW: CPT

## 2021-09-21 PROCEDURE — 85025 COMPLETE CBC W/AUTO DIFF WBC: CPT

## 2021-09-21 PROCEDURE — 83605 ASSAY OF LACTIC ACID: CPT

## 2021-09-21 PROCEDURE — 6360000002 HC RX W HCPCS: Performed by: FAMILY MEDICINE

## 2021-09-21 PROCEDURE — 97110 THERAPEUTIC EXERCISES: CPT

## 2021-09-21 PROCEDURE — 51700 IRRIGATION OF BLADDER: CPT

## 2021-09-21 PROCEDURE — 1200000000 HC SEMI PRIVATE

## 2021-09-21 PROCEDURE — 97530 THERAPEUTIC ACTIVITIES: CPT

## 2021-09-21 PROCEDURE — 70450 CT HEAD/BRAIN W/O DYE: CPT

## 2021-09-21 PROCEDURE — 6370000000 HC RX 637 (ALT 250 FOR IP): Performed by: FAMILY MEDICINE

## 2021-09-21 PROCEDURE — 82962 GLUCOSE BLOOD TEST: CPT

## 2021-09-21 PROCEDURE — 80048 BASIC METABOLIC PNL TOTAL CA: CPT

## 2021-09-21 PROCEDURE — 2580000003 HC RX 258: Performed by: FAMILY MEDICINE

## 2021-09-21 RX ORDER — ACETAMINOPHEN 650 MG/1
650 SUPPOSITORY RECTAL EVERY 4 HOURS PRN
Status: DISCONTINUED | OUTPATIENT
Start: 2021-09-21 | End: 2021-09-24 | Stop reason: HOSPADM

## 2021-09-21 RX ORDER — DEXTROSE AND SODIUM CHLORIDE 5; .45 G/100ML; G/100ML
INJECTION, SOLUTION INTRAVENOUS CONTINUOUS
Status: DISCONTINUED | OUTPATIENT
Start: 2021-09-21 | End: 2021-09-24 | Stop reason: HOSPADM

## 2021-09-21 RX ORDER — LOSARTAN POTASSIUM 50 MG/1
25 TABLET ORAL DAILY
Status: DISCONTINUED | OUTPATIENT
Start: 2021-09-21 | End: 2021-09-22

## 2021-09-21 RX ADMIN — WATER 1000 MG: 1 INJECTION INTRAMUSCULAR; INTRAVENOUS; SUBCUTANEOUS at 14:05

## 2021-09-21 RX ADMIN — DEXTROSE AND SODIUM CHLORIDE: 5; 450 INJECTION, SOLUTION INTRAVENOUS at 20:07

## 2021-09-21 RX ADMIN — CETIRIZINE HYDROCHLORIDE 10 MG: 10 TABLET, FILM COATED ORAL at 09:32

## 2021-09-21 RX ADMIN — BISACODYL 10 MG: 10 SUPPOSITORY RECTAL at 09:25

## 2021-09-21 RX ADMIN — Medication 10 ML: at 09:47

## 2021-09-21 RX ADMIN — LOSARTAN POTASSIUM 25 MG: 50 TABLET, FILM COATED ORAL at 16:00

## 2021-09-21 RX ADMIN — ACETAMINOPHEN 650 MG: 650 SUPPOSITORY RECTAL at 20:07

## 2021-09-21 RX ADMIN — DEXTROSE AND SODIUM CHLORIDE: 5; 450 INJECTION, SOLUTION INTRAVENOUS at 20:08

## 2021-09-21 ASSESSMENT — PAIN SCALES - GENERAL
PAINLEVEL_OUTOF10: 0
PAINLEVEL_OUTOF10: 5
PAINLEVEL_OUTOF10: 0

## 2021-09-21 ASSESSMENT — ENCOUNTER SYMPTOMS
VOMITING: 0
NAUSEA: 0

## 2021-09-21 NOTE — CARE COORDINATION
LVM for POAconnie explaining IMM letter and that I needed verbal signature for the letter.  Electronically signed by Jemima Castaneda on 9/21/2021 at 1:06 PM

## 2021-09-21 NOTE — PLAN OF CARE
Problem: Skin Integrity:  Goal: Will show no infection signs and symptoms  9/21/2021 1041 by Peng Buckner RN  Outcome: Met This Shift  Note: No skin breakdown     Problem: Skin Integrity:  Goal: Absence of new skin breakdown  9/21/2021 1041 by Peng Buckner RN  Outcome: Met This Shift     Problem: Confusion - Acute:  Goal: Absence of continued neurological deterioration signs and symptoms  9/21/2021 1041 by Peng Buckner RN  Outcome: Met This Shift     Problem: Confusion - Acute:  Goal: Mental status will be restored to baseline  9/21/2021 1041 by Peng Buckner RN  Outcome: Met This Shift     Problem: Discharge Planning:  Goal: Ability to perform activities of daily living will improve  9/21/2021 1041 by Peng Buckner RN  Outcome: Met This Shift     Problem: Discharge Planning:  Goal: Participates in care planning  9/21/2021 1041 by Peng Buckner RN  Outcome: Met This Shift     Problem: Injury - Risk of, Physical Injury:  Goal: Absence of physical injury  9/21/2021 1041 by Peng Buckner RN  Outcome: Met This Shift     Problem: Injury - Risk of, Physical Injury:  Goal: Absence of physical injury  9/21/2021 1041 by Peng Buckner RN  Outcome: Met This Shift     Problem: Injury - Risk of, Physical Injury:  Goal: Will remain free from falls  9/21/2021 1041 by Peng Buckner RN  Outcome: Met This Shift

## 2021-09-21 NOTE — PROGRESS NOTES
Progress Note  Date:2021       Room:0321/0321-02  Patient Katlyn Valenzuela     YOB: 1926     Age:89 y.o. Subjective    Subjective:  Symptoms:  Stable. She reports malaise and weakness. Diet:  Poor intake. No nausea or vomiting. Activity level: Impaired due to weakness. Pain:  She reports no pain. Review of Systems   Constitutional: Negative for fever. Gastrointestinal: Negative for nausea and vomiting. Neurological: Positive for weakness. All other systems reviewed and are negative. Objective         Vitals Last 24 Hours:  TEMPERATURE:  Temp  Av.9 °F (37.2 °C)  Min: 98.3 °F (36.8 °C)  Max: 100.6 °F (38.1 °C)  RESPIRATIONS RANGE: Resp  Av.7  Min: 18  Max: 20  PULSE OXIMETRY RANGE: SpO2  Av %  Min: 95 %  Max: 97 %  PULSE RANGE: Pulse  Av  Min: 99  Max: 108  BLOOD PRESSURE RANGE: Systolic (98IHT), PYV:336 , Min:112 , WIW:949   ; Diastolic (55PCP), VZX:07, Min:63, Max:89    I/O (24Hr): Intake/Output Summary (Last 24 hours) at 2021 1550  Last data filed at 2021 1243  Gross per 24 hour   Intake 120 ml   Output 600 ml   Net -480 ml     Objective:  General Appearance:  Ill-appearing. Vital signs: (most recent): Blood pressure (!) 170/85, pulse 102, temperature 98.3 °F (36.8 °C), temperature source Oral, resp. rate 20, height 5' 6\" (1.676 m), weight 146 lb (66.2 kg), SpO2 96 %. Vital signs are normal.  No fever. Output: Producing urine and producing stool. HEENT: Normal HEENT exam.    Lungs:  Normal effort and normal respiratory rate. Heart: Normal rate. Regular rhythm. Abdomen: Abdomen is soft. Bowel sounds are normal.   There is no abdominal tenderness. Extremities: Decreased range of motion. Neurological: Patient is alert. Skin:  Warm and dry.       Labs/Imaging/Diagnostics    Labs:  CBC:  Recent Labs     21  0558   WBC 14.0*   RBC 3.65   HGB 11.8   HCT 34.1   MCV 93.4   RDW 12.6    CHEMISTRIES:  Recent Labs     09/21/21  0558      K 4.4      CO2 25   BUN 16   CREATININE 0.6   GLUCOSE 138*     PT/INR:No results for input(s): PROTIME, INR in the last 72 hours. APTT:No results for input(s): APTT in the last 72 hours. LIVER PROFILE:No results for input(s): AST, ALT, BILIDIR, BILITOT, ALKPHOS in the last 72 hours. Imaging Last 24 Hours:  CT HEAD WO CONTRAST    Result Date: 9/21/2021  EXAMINATION: CT OF THE HEAD WITHOUT CONTRAST  9/21/2021 12:52 pm TECHNIQUE: CT of the head was performed without the administration of intravenous contrast. Dose modulation, iterative reconstruction, and/or weight based adjustment of the mA/kV was utilized to reduce the radiation dose to as low as reasonably achievable. COMPARISON: None. HISTORY: ORDERING SYSTEM PROVIDED HISTORY: altered mental status/rule out stroke TECHNOLOGIST PROVIDED HISTORY: Reason for exam:->altered mental status/rule out stroke Has a \"code stroke\" or \"stroke alert\" been called? ->No FINDINGS: BRAIN/VENTRICLES: The ventricles and subarachnoid spaces are somewhat prominent suggesting parenchymal volume loss. There is bilateral white matter lucency which is nonspecific but likely represents chronic microvascular ischemic change. There is no radiographic evidence for intra-axial or extra-axial fluid collection, no mass, nor hematoma. ORBITS: The visualized portion of the orbits demonstrate no acute abnormality. SINUSES: The visualized paranasal sinuses and mastoid air cells demonstrate no acute abnormality. SOFT TISSUES/SKULL:  No acute abnormality of the visualized skull or soft tissues. No acute intracranial abnormality. Parenchymal volume loss is likely age related. Moderate white matter lucency is nonspecific but likely represents chronic microvascular ischemic change. If there is persistent neurological change consider follow-up CT or MRI.      Assessment//Plan           Hospital Problems         Last Modified POA Pubic ramus fracture, right, closed, initial encounter ([facility] [address] 9/16/2021 Yes        Assessment:    Condition: In stable condition. Unchanged. (Proteus UTI POA  debility  Pelvic fracture  leukocytosis). Plan:   Regular diet. (Waiting for SNF placement  Continue PT  Check cultures, lactic acid and chest xray  Ct done   Will follow).        Electronically signed by Marie Mcdonald DO on 9/21/21 at 3:50 PM EDT

## 2021-09-21 NOTE — PROGRESS NOTES
OT BEDSIDE TREATMENT NOTE      Date:2021  Patient Name: Nadege Lancaster  MRN: [id number]  : 1926  Room: [address] [place] [address]       Evaluating OT: Beth Tubbs OTR/L #[id number]      Referring Provider and Specific Provider Orders/Date:   21 1145   OT eval and treat Start: 21 1145, End: 21 1145, ONE TIME, Standing Count: 1 Occurrences, R      Neli Kramer, DO       Placement Recommendation: BILLY         Diagnosis:   1. Closed fracture of ramus of right pubis, initial encounter ([facility]    2. Pubic ramus fracture, right, closed, initial encounter ([facility]    3. Closed fracture of sacrum, unspecified portion of sacrum, initial encounter ([facility]    4. Closed fracture of transverse process of lumbar vertebra, initial encounter [facility])            Surgery: None this admission - no surgical intervention warranted at this time, per ortho surgery.        Pertinent Medical History:       Past Medical History        Past Medical History:   Diagnosis Date    Anxiety      Bladder incontinence      Hyperlipidemia      Osteoarthritis      Sinus infection              Past Surgical History         Past Surgical History:   Procedure Laterality Date    APPENDECTOMY        EYE SURGERY Left 2016     cataract    FOOT SURGERY        HIP FRACTURE SURGERY        HYSTERECTOMY        KNEE ARTHROSCOPY        ROTATOR CUFF REPAIR        TONSILLECTOMY                 Precautions:  Fall Risk, partial weight bearing R LE (50%) with walker, dementia, alarm, confusion/fatigue, mostly non verbal on tx date (2021)  Comment: pt's daughter concerned pt may have had a stroke since she has not talked a lot since  and has a \"blank stare\"; daughter also states it appears like \"something is wrong with pt's mouth\"; pt did appear to have slight L side facial droop;  Per daughter, pt has a history of multiple TIA's; pt unable to follow through with simple tasks; nsg notified; pt has hx of hip sx with metal (cannot have MRI)      Assessment of current deficits   [x]? Functional mobility            [x]?ADLs           [x]? Strength                   [x]? Cognition    [x]? Functional transfers          [x]? IADLs         [x]? Safety Awareness   [x]? Endurance    []? Fine Coordination              [x]? Balance      []? Vision/perception    []? Sensation      []? Gross Motor Coordination  []? ROM           []? Delirium                   []? Motor Control      OT PLAN OF CARE   OT POC based on physician orders, patient diagnosis and results of clinical assessment     Frequency/Duration 2-5 days/wk for 2 weeks PRN      Specific OT Treatment Interventions to include:   * Instruction/training on adapted ADL techniques and AE recommendations to increase functional independence within precautions       * Training on energy conservation strategies, correct breathing pattern and techniques to improve independence/tolerance for self-care routine  * Functional transfer/mobility training/DME recommendations for increased independence, safety, and fall prevention  * Patient/Family education to increase follow through with safety techniques and functional independence  * Recommendation of environmental modifications for increased safety with functional transfers/mobility and ADLs  * Cognitive retraining/development of therapeutic activities to improve problem solving, judgement, memory, and attention for increased safety/participation in ADL/IADL tasks  * Therapeutic exercise to improve motor endurance, ROM, and functional strength for ADLs/functional transfers  * Therapeutic activities to facilitate/challenge dynamic balance, stand tolerance for increased safety and independence with ADLs  * Positioning to improve skin integrity, interaction with environment and functional independence  * Delirium prevention/treatment     Recommended Adaptive Equipment: TBD      Home Living: Pt resides at an [address] [place] [address] Rd.   Bathroom set-up: Not reported     Equipment owned: wheeled walker, per chart review.      Prior Level of Function: Requires assist with ADLs at baseline, Dependent with IADLs; ambulated with wheeled walker, per chart review.      Pain Level: Facial grimmacing noted with movement of R LE; nsg aware  ; positioning provided     Cognition: A&O: 1/4; Pt demo's difficulty with following through with tasks d/t lethargy. Pt is a poor historian and very confused at [facility].               Memory: poor              Sequencing: poor              Problem solving: poor              Judgement/safety: poor     [facility]   AM-PAC Daily Activity Inpatient   How much help for putting on and taking off regular lower body clothing?: Total  How much help for Bathing?: A Lot  How much help for Toileting?: Total  How much help for putting on and taking off regular upper body clothing?: A Lot  How much help for taking care of personal grooming?: A Lot  How much help for eating meals?: A Lot  AM-[facility] Inpatient Daily Activity Raw Score: 10                Functional Assessment:     Initial Eval Status  Date: 9/19/21 Treatment Status  Date:9/21/2021 STGs = LTGs  Time frame: 10-14 days   Feeding Minimal Assist   Per staff report pt can feed self but requires assist cutting/setting up items on tray. Attempted assist to bring spoon with food to mouth with pt turning her head and not opening mouth; pt did not attempt to hold spoon; pt refused drinking water, as well; nsg aware; daughter was going to attempt to have pt eat for her at end of session Supervision    Grooming Moderate Assist   Pt able to bring hand to face with cloth but required mod-max cueing and assist for initiation and problem solving.   Max A to wipe face with cloth; pt demo's drooling of food from L side of mouth Stand by Assist    UB Dressing Moderate Assist  Mod A to tie/adjust back of gown Stand by Assist    LB Dressing Dependent  Dep to adjust socks; Dep to don heelbos Minimal Assist    Bathing Maximal Assist  N/T; pt had assist for bathing prior to session  Minimal Assist    Toileting Dependent   Dep - love catheter ; Dep for hygiene d/t incontinence of bowel after having a suppository Minimal Assist    Bed Mobility  Supine to sit: NT   Sit to supine: Maximal Assist  Max A/Dep for supine to sit; max A for scooting; max A/Dep for sit to supine; assist to guide UB and LE's to/from supine; max A x 2  to scoot to [facility] with bed with use of TAPS system;  max A for side rolling multiple times for hygiene,  to change chux pad and adjust TAPS system; positioning under R LE on RTB  Supine to sit: Minimal Assist   Sit to supine: Minimal Assist    Functional Transfers Dependent   Low pivot transfer from chair to EOB. Max A x 2 for sit to stand from EOB with HHA x 2 for ~2 minutes as bed linens changed; pt required assist for upright position  2* to flexed posture; pt tolerated sitting EOB for ~20 minutes with mod/max A for upright position with pt demo'ing strong L lateral and posterior lean initially Moderate Assist    Functional Mobility NT due to pt overall debility, decreased activity tolerance, balance deficits, safety and fall risk.   NT due to pt overall debility, decreased activity tolerance, decreased standing balance/tolerance,  balance deficits, safety and fall risk.   Moderate Assist with wheeled walker   Balance Sitting:     Static: fair     Dynamic: fair -  Standing: NT Sitting:     Static: fair     Dynamic: fair -/poor with max A for upright position d/t strong L lateral lean  Standing: poor with max A x 2 with HHA x 2 Sitting:     Static: fair +    Dynamic: fair +  Standing: fair  with walker   Activity Tolerance poor   poor Increase standing tolerance >1-2 minutes for improved engagement with functional transfers and indep in ADLs   Visual/  Perceptual Glasses: None seen at bedside       NA       Hand Dominance: Not reported      Trunk Ex's: 10 x 2 reps in trunk flexion/extension with Max A in attempts to increase sitting balance d/t strong L lateral and posterior lean. Hearing:  WFL   Sensation:   No c/o numbness or tingling  Tone:  WFL   Edema: None observed       Comments: Patient cleared by nursing staff. Upon arrival pt supine in bed with R LE externally rotated and flexed at knee. Daughter present. Pt agreeable to OT tx session. Pt educated with regards to bed mobility, positioning, LE dressing, UE dressing, toileting/hygiene, hand placement, safety awareness, static sitting balance, attempted self feeding, grooming tasks,  Trunk ex's, ECT's. At end of session pt seated in chair position in bed , positioning provided; alarm on; all lines and tubes intact, call light within reach. Overall, pt demonstrated decreased independence and safety during completion of ADL/functional transfers/mobility tasks. Pt would benefit from continued skilled OT to increase safety and independence with completion of ADL/IADL tasks for functional independence and quality of life. Daughter states pt is totally different than yesterday, as she is not really talking a lot today. Positioning provided d/t external rotation of R LE. Pt required cues and education as noted above for safe facilitation and completion of tasks. Therapist provided skilled monitoring of patient's response during treatment session. Prior to and at the end of session, environmental modifications/ line management completed for patients safety and efficiency of treatment session. Overall, patient demonstrates moderate difficulties with completion of BADLs and IADLs. Factors contributing to these difficulties include pain, decreased endurance, and generalized weakness. As noted above, patient likely to benefit from further OT intervention to increase independence, safety, and overall quality of life. Treatment:     ? Bed mobility: Facilitated bed mobility with cues for proper body mechanics and sequencing to prepare for ADL completion. ?  ADL completion: Self-care retraining for the above-mentioned ADLs; training on proper hand placement, safety technique, sequencing, and energy conservation techniques. ? Postural Balance: Sitting/standing balance retraining to improve righting reactions with postural changes during ADLs. ? Skilled positioning: Proper positioning to improve interaction with environment, overall functioning   ? Therapeutic Ex's: To increase B UE strength/ROM/endurance required for functional transfers and sitting balance for ADL participation. · Pt has made fair minus progress towards set goals    · OT 1-3x/week for 5-7 days during hospitalization       Treatment Time also includes thorough review of current medical information, gathering information on past medical history/social history and prior level of function, informal observation of tasks, assessment of data and education on plan of care and goals.     Treatment Time In:11:10 AM    Treatment Time Out: 11:48 AM        Treatment Charges: Mins Units   ADL/Home Mgt     42727 10 1   Thera Activities     30186 18 1   Ther Ex                 52851 10 1   Manual Therapy    62515     Neuro Re-ed         58060     Orthotic manage/training                               52038     Non Billable Time     Total Timed Treatment [address], SHEETS/L #[id number]

## 2021-09-21 NOTE — PROGRESS NOTES
Physical Therapy        0321/0321-02    Patient unavailable for physical therapy treatment due to patient lethargic at the time, will check back on patient when patient is more appropriate and after she has eaten. PTA talked with patient's daughter.     Larissa Dinero, PTA  #[id number]

## 2021-09-21 NOTE — CARE COORDINATION
CM note: Insurance provider now waiving precert for SNF, BD Max was obtained yesterday but per [facility]. E's lab will not be resulted today. IF patient is medically ready for discharge will need to obtain a respiratory panel as facility requires a neg PCR covid test for admission. Per nursing patient did spike a temperature this morning, will follow for physician notes and obtain only if discharge will be today.

## 2021-09-21 NOTE — PROGRESS NOTES
Physical Therapy        0321/0321-02    Patient unavailable for physical therapy treatment due to patient does not awake, even with sternal rub and calling name multiple times. Will attempt treatment at later date.      Elizabeth Quinn, PTA  #[id number]

## 2021-09-21 NOTE — PLAN OF CARE
Problem: Skin Integrity:  Goal: Will show no infection signs and symptoms  Description: Will show no infection signs and symptoms  Outcome: Met This Shift  Goal: Absence of new skin breakdown  Description: Absence of new skin breakdown  Outcome: Met This Shift     Problem: Confusion - Acute:  Goal: Absence of continued neurological deterioration signs and symptoms  Description: Absence of continued neurological deterioration signs and symptoms  Outcome: Met This Shift  Goal: Mental status will be restored to baseline  Description: Mental status will be restored to baseline  Outcome: Met This Shift     Problem: Discharge Planning:  Goal: Ability to perform activities of daily living will improve  Description: Ability to perform activities of daily living will improve  Outcome: Met This Shift  Goal: Participates in care planning  Description: Participates in care planning  Outcome: Met This Shift     Problem: Injury - Risk of, Physical Injury:  Goal: Absence of physical injury  Description: Absence of physical injury  Outcome: Met This Shift  Goal: Will remain free from falls  Description: Will remain free from falls  Outcome: Met This Shift     Problem: Mood - Altered:  Goal: Mood stable  Description: Mood stable  Outcome: Met This Shift  Goal: Absence of abusive behavior  Description: Absence of abusive behavior  Outcome: Met This Shift  Goal: Verbalizations of feeling emotionally comfortable while being cared for will increase  Description: Verbalizations of feeling emotionally comfortable while being cared for will increase  Outcome: Met This Shift     Problem: Psychomotor Activity - Altered:  Goal: Absence of psychomotor disturbance signs and symptoms  Description: Absence of psychomotor disturbance signs and symptoms  Outcome: Met This Shift     Problem: Sensory Perception - Impaired:  Goal: Demonstrations of improved sensory functioning will increase  Description: Demonstrations of improved sensory functioning will increase  Outcome: Met This Shift  Goal: Decrease in sensory misperception frequency  Description: Decrease in sensory misperception frequency  Outcome: Met This Shift  Goal: Able to refrain from responding to false sensory perceptions  Description: Able to refrain from responding to false sensory perceptions  Outcome: Met This Shift  Goal: Demonstrates accurate environmental perceptions  Description: Demonstrates accurate environmental perceptions  Outcome: Met This Shift  Goal: Able to distinguish between reality-based and nonreality-based thinking  Description: Able to distinguish between reality-based and nonreality-based thinking  Outcome: Met This Shift  Goal: Able to interrupt nonreality-based thinking  Description: Able to interrupt nonreality-based thinking  Outcome: Met This Shift     Problem: Sleep Pattern Disturbance:  Goal: Appears well-rested  Description: Appears well-rested  Outcome: Met This Shift

## 2021-09-21 NOTE — PROGRESS NOTES
Physical Therapy        0321/0321-02    Patient unavailable for physical therapy treatment due to nursing reports patient is inappropriate for out of bed activities as patient just returned from CT scan to rule out possible stroke. Patient resting at the moment with patient's daughter present, will attempt bed exercises at later time.      Karen Schofield, PTA  #[id number]

## 2021-09-22 ENCOUNTER — APPOINTMENT (OUTPATIENT)
Dept: NEUROLOGY | Age: 86
DRG: 689 | End: 2021-09-22
Payer: MEDICARE

## 2021-09-22 PROBLEM — R41.82 ALTERED MENTAL STATUS: Status: ACTIVE | Noted: 2021-09-22

## 2021-09-22 LAB
ADENOVIRUS BY PCR: NOT DETECTED
ANION GAP SERPL CALCULATED.3IONS-SCNC: 10 MMOL/L (ref 7–16)
BASOPHILS ABSOLUTE: 0.01 E9/L (ref 0–0.2)
BASOPHILS RELATIVE PERCENT: 0.1 % (ref 0–2)
BORDETELLA PARAPERTUSSIS BY PCR: NOT DETECTED
BORDETELLA PERTUSSIS BY PCR: NOT DETECTED
BUN BLDV-MCNC: 19 MG/DL (ref 6–23)
C-REACTIVE PROTEIN: 14.9 MG/DL (ref 0–0.4)
CALCIUM SERPL-MCNC: 8.6 MG/DL (ref 8.6–10.2)
CHLAMYDOPHILIA PNEUMONIAE BY PCR: NOT DETECTED
CHLORIDE BLD-SCNC: 100 MMOL/L (ref 98–107)
CO2: 25 MMOL/L (ref 22–29)
CORONAVIRUS 229E BY PCR: NOT DETECTED
CORONAVIRUS HKU1 BY PCR: NOT DETECTED
CORONAVIRUS NL63 BY PCR: NOT DETECTED
CORONAVIRUS OC43 BY PCR: NOT DETECTED
CREAT SERPL-MCNC: 0.6 MG/DL (ref 0.5–1)
EOSINOPHILS ABSOLUTE: 0.01 E9/L (ref 0.05–0.5)
EOSINOPHILS RELATIVE PERCENT: 0.1 % (ref 0–6)
GFR AFRICAN AMERICAN: >60
GFR NON-AFRICAN AMERICAN: >60 ML/MIN/1.73
GLUCOSE BLD-MCNC: 168 MG/DL (ref 74–99)
HCT VFR BLD CALC: 34.6 % (ref 34–48)
HEMOGLOBIN: 11.7 G/DL (ref 11.5–15.5)
HUMAN METAPNEUMOVIRUS BY PCR: NOT DETECTED
HUMAN RHINOVIRUS/ENTEROVIRUS BY PCR: NOT DETECTED
IMMATURE GRANULOCYTES #: 0.06 E9/L
IMMATURE GRANULOCYTES %: 0.4 % (ref 0–5)
INFLUENZA A BY PCR: NOT DETECTED
INFLUENZA B BY PCR: NOT DETECTED
LYMPHOCYTES ABSOLUTE: 1.55 E9/L (ref 1.5–4)
LYMPHOCYTES RELATIVE PERCENT: 11.3 % (ref 20–42)
MCH RBC QN AUTO: 32.3 PG (ref 26–35)
MCHC RBC AUTO-ENTMCNC: 33.8 % (ref 32–34.5)
MCV RBC AUTO: 95.6 FL (ref 80–99.9)
MONOCYTES ABSOLUTE: 0.91 E9/L (ref 0.1–0.95)
MONOCYTES RELATIVE PERCENT: 6.7 % (ref 2–12)
MYCOPLASMA PNEUMONIAE BY PCR: NOT DETECTED
NEUTROPHILS ABSOLUTE: 11.13 E9/L (ref 1.8–7.3)
NEUTROPHILS RELATIVE PERCENT: 81.4 % (ref 43–80)
PARAINFLUENZA VIRUS 1 BY PCR: NOT DETECTED
PARAINFLUENZA VIRUS 2 BY PCR: NOT DETECTED
PARAINFLUENZA VIRUS 3 BY PCR: DETECTED
PARAINFLUENZA VIRUS 4 BY PCR: NOT DETECTED
PDW BLD-RTO: 12.9 FL (ref 11.5–15)
PLATELET # BLD: 304 E9/L (ref 130–450)
PMV BLD AUTO: 9.1 FL (ref 7–12)
POTASSIUM SERPL-SCNC: 3.9 MMOL/L (ref 3.5–5)
PROCALCITONIN: 0.12 NG/ML (ref 0–0.08)
RBC # BLD: 3.62 E12/L (ref 3.5–5.5)
RESPIRATORY SYNCYTIAL VIRUS BY PCR: NOT DETECTED
SARS-COV-2, PCR: NOT DETECTED
SARS-COV-2: NOT DETECTED
SEDIMENTATION RATE, ERYTHROCYTE: 31 MM/HR (ref 0–20)
SODIUM BLD-SCNC: 135 MMOL/L (ref 132–146)
SOURCE: NORMAL
WBC # BLD: 13.7 E9/L (ref 4.5–11.5)

## 2021-09-22 PROCEDURE — 36415 COLL VENOUS BLD VENIPUNCTURE: CPT

## 2021-09-22 PROCEDURE — 97110 THERAPEUTIC EXERCISES: CPT

## 2021-09-22 PROCEDURE — 80048 BASIC METABOLIC PNL TOTAL CA: CPT

## 2021-09-22 PROCEDURE — 1200000000 HC SEMI PRIVATE

## 2021-09-22 PROCEDURE — 0202U NFCT DS 22 TRGT SARS-COV-2: CPT

## 2021-09-22 PROCEDURE — 92610 EVALUATE SWALLOWING FUNCTION: CPT | Performed by: SPEECH-LANGUAGE PATHOLOGIST

## 2021-09-22 PROCEDURE — 87449 NOS EACH ORGANISM AG IA: CPT

## 2021-09-22 PROCEDURE — 86140 C-REACTIVE PROTEIN: CPT

## 2021-09-22 PROCEDURE — 6370000000 HC RX 637 (ALT 250 FOR IP): Performed by: FAMILY MEDICINE

## 2021-09-22 PROCEDURE — 95819 EEG AWAKE AND ASLEEP: CPT

## 2021-09-22 PROCEDURE — 84145 PROCALCITONIN (PCT): CPT

## 2021-09-22 PROCEDURE — 6360000002 HC RX W HCPCS: Performed by: CLINICAL NURSE SPECIALIST

## 2021-09-22 PROCEDURE — 99222 1ST HOSP IP/OBS MODERATE 55: CPT | Performed by: FAMILY MEDICINE

## 2021-09-22 PROCEDURE — 97530 THERAPEUTIC ACTIVITIES: CPT

## 2021-09-22 PROCEDURE — 2580000003 HC RX 258: Performed by: CLINICAL NURSE SPECIALIST

## 2021-09-22 PROCEDURE — 85651 RBC SED RATE NONAUTOMATED: CPT

## 2021-09-22 PROCEDURE — 2580000003 HC RX 258: Performed by: FAMILY MEDICINE

## 2021-09-22 PROCEDURE — 85025 COMPLETE CBC W/AUTO DIFF WBC: CPT

## 2021-09-22 RX ORDER — SODIUM CHLORIDE 9 MG/ML
25 INJECTION, SOLUTION INTRAVENOUS EVERY 8 HOURS
Status: DISCONTINUED | OUTPATIENT
Start: 2021-09-22 | End: 2021-09-24 | Stop reason: HOSPADM

## 2021-09-22 RX ORDER — LOSARTAN POTASSIUM 50 MG/1
50 TABLET ORAL DAILY
Status: DISCONTINUED | OUTPATIENT
Start: 2021-09-22 | End: 2021-09-24 | Stop reason: HOSPADM

## 2021-09-22 RX ADMIN — PIPERACILLIN AND TAZOBACTAM 3375 MG: 3; .375 INJECTION, POWDER, FOR SOLUTION INTRAVENOUS at 16:24

## 2021-09-22 RX ADMIN — DEXTROSE AND SODIUM CHLORIDE: 5; 450 INJECTION, SOLUTION INTRAVENOUS at 15:30

## 2021-09-22 RX ADMIN — ACETAMINOPHEN 650 MG: 650 SUPPOSITORY RECTAL at 05:14

## 2021-09-22 ASSESSMENT — PAIN SCALES - GENERAL: PAINLEVEL_OUTOF10: 3

## 2021-09-22 NOTE — PLAN OF CARE
Problem: Skin Integrity:  Goal: Will show no infection signs and symptoms  Description: Will show no infection signs and symptoms  9/22/2021 1658 by Suzanne Kong RN  Outcome: Met This Shift  9/22/2021 1656 by Suzanne Kong RN  Outcome: Met This Shift  Goal: Absence of new skin breakdown  Description: Absence of new skin breakdown  9/22/2021 1658 by Suzanne Kong RN  Outcome: Met This Shift  9/22/2021 1656 by Suzanne Kong RN  Outcome: Met This Shift     Problem: Injury - Risk of, Physical Injury:  Goal: Absence of physical injury  Description: Absence of physical injury  Outcome: Met This Shift  Goal: Will remain free from falls  Description: Will remain free from falls  Outcome: Met This Shift     Problem: Falls - Risk of:  Goal: Absence of physical injury  Description: Absence of physical injury  Outcome: Met This Shift  Goal: Will remain free from falls  Description: Will remain free from falls  Outcome: Met This Shift

## 2021-09-22 NOTE — PROGRESS NOTES
[]?Sensation      []? Gross Motor Coordination  []? ROM           []? Delirium                   []? Motor Control      OT PLAN OF CARE   OT POC based on physician orders, patient diagnosis and results of clinical assessment     Frequency/Duration 2-5 days/wk for 2 weeks PRN      Specific OT Treatment Interventions to include:   * Instruction/training on adapted ADL techniques and AE recommendations to increase functional independence within precautions       * Training on energy conservation strategies, correct breathing pattern and techniques to improve independence/tolerance for self-care routine  * Functional transfer/mobility training/DME recommendations for increased independence, safety, and fall prevention  * Patient/Family education to increase follow through with safety techniques and functional independence  * Recommendation of environmental modifications for increased safety with functional transfers/mobility and ADLs  * Cognitive retraining/development of therapeutic activities to improve problem solving, judgement, memory, and attention for increased safety/participation in ADL/IADL tasks  * Therapeutic exercise to improve motor endurance, ROM, and functional strength for ADLs/functional transfers  * Therapeutic activities to facilitate/challenge dynamic balance, stand tolerance for increased safety and independence with ADLs  * Positioning to improve skin integrity, interaction with environment and functional independence  * Delirium prevention/treatment     Recommended Adaptive Equipment: TBD      Home Living: Pt resides at [address]   Bathroom set-up: Not reported     Equipment owned: wheeled walker, per chart review.      Prior Level of Function: Requires assist with ADLs at baseline, Dependent with IADLs; ambulated with wheeled walker, per chart review.      Pain Level: Facial grimmacing noted with movement of R LE; nsg aware  ; positioning provided     Cognition: A&O: 1/4; Pt demo's difficulty with following through with tasks d/t lethargy. Pt is a poor historian and very confused at eval.               Memory: poor              Sequencing: poor              Problem solving: poor              Judgement/safety: poor     [facility]   AM-PAC Daily Activity Inpatient   How much help for putting on and taking off regular lower body clothing?: Total  How much help for Bathing?: Total  How much help for Toileting?: Total  How much help for putting on and taking off regular upper body clothing?: Total  How much help for taking care of personal grooming?: Total  How much help for eating meals? :Total  AM-PAC Inpatient Daily Activity Raw Score: 6                Functional Assessment:     Initial Eval Status  Date: 9/19/21 Treatment Status  Date:9/22/2021 STGs = LTGs  Time frame: 10-14 days   Feeding Minimal Assist   Per staff report pt can feed self but requires assist cutting/setting up items on tray. Attempted assist to bring spoon with food to mouth with pt turning her head and not opening mouth; pt did not attempt to hold spoon; pt refused drinking water, as well; nsg aware; daughter was going to attempt to have pt eat for her at end of session Supervision    Grooming Moderate Assist   Pt able to bring hand to face with cloth but required mod-max cueing and assist for initiation and problem solving.   N/T Stand by Assist    UB Dressing Moderate Assist  Max A to tie/adjust back of gown Stand by Assist    LB Dressing Dependent  Dep to adjust socks; Dep to don heelbos Minimal Assist    Bathing Maximal Assist  N/T; pt had assist for bathing prior to session  Minimal Assist    Toileting Dependent   Dep - love catheter  Minimal Assist    Bed Mobility  Supine to sit: NT   Sit to supine: Maximal Assist  Max A/Dep for supine to sit; max A/Dep for scooting; max A/Dep for sit to supine; strong posterior and L lateral lean; assist to guide UB and LE's to/from supine; max A  to scoot to [facility] with bed with use of TAPS system and bed in trendelenburg position;   positioning under R LE on RTB  Supine to sit: Minimal Assist   Sit to supine: Minimal Assist    Functional Transfers Dependent   Low pivot transfer from chair to EOB. N/T Moderate Assist    Functional Mobility NT due to pt overall debility, decreased activity tolerance, balance deficits, safety and fall risk. N/T due to pt overall debility, decreased activity tolerance, decreased sitting balance/tolerance,  balance deficits, safety and fall risk.   Moderate Assist with wheeled walker   Balance Sitting:     Static: fair     Dynamic: fair -  Standing: NT Sitting:     Static: poor with max A for upright position d/t strong posterior and L lateral lean     Dynamic: poor with max A for upright position d/t strong L lateral lean and posterior lean  Standing: N/T d/t poor sitting balance Sitting:     Static: fair +    Dynamic: fair +  Standing: fair  with walker   Activity Tolerance poor   poor Increase standing tolerance >1-2 minutes for improved engagement with functional transfers and indep in ADLs   Visual/  Perceptual Glasses: None seen at bedside       NA       Hand Dominance: Not reported      Trunk Ex's: 10 x reps in trunk flexion/extension with Max A in attempts to increase sitting balance d/t strong L lateral and posterior lean. Hearing:  WFL   Sensation:   No c/o numbness or tingling  Tone:  WFL   Edema: None observed       Comments: Patient cleared by nursing staff. Upon arrival pt supine in bed with R LE externally rotated and flexed at knee. Daughter present. Pt agreeable to OT tx session. Pt educated with regards to bed mobility, positioning, LE dressing, UE dressing, hand placement, safety awareness, static sitting balance, attempted self feeding, Trunk ex's, ECT's. At end of session pt seated in bed with HOB elevated, positioning provided; ice provided for R pelvic region; alarm on; all lines and tubes intact, call light within reach.  Overall, pt medical history/social history and prior level of function, informal observation of tasks, assessment of data and education on plan of care and goals.     Treatment Time In:11:07 AM    Treatment Time Out: 11:32 AM        Treatment Charges: Mins Units   ADL/Home Mgt     [address]     14838 15 1   Ther Ex                 99891 10 1   Manual Therapy    95184     Neuro Re-ed         73305     Orthotic manage/training                               64728     Non Billable Time     Total Timed Treatment 25 [address] [facility], [address] [place] [address]

## 2021-09-22 NOTE — PROGRESS NOTES
SPEECH/LANGUAGE PATHOLOGY  CLINICAL ASSESSMENT OF SWALLOWING FUNCTION   and PLAN OF CARE    PATIENT NAME:  Mayra Darnell  (female)     MRN:  [id number]    :  1926  (89 y.o.)  STATUS:  Inpatient: Room 0321/0321-02    TODAY'S DATE:  2021  REFERRING PROVIDER:     SLP eval and treat Start: 21, End: 21, ONE TIME, Standing Count: 1 Occurrences, R    Dilcia Prieto DO  REASON FOR REFERRAL: change in alertness   EVALUATING THERAPIST: CONI Ritter                 RESULTS:    DYSPHAGIA DIAGNOSIS:   Clinical indicators of limited intake on exam, not able to determine type or severity of dysphagia       DIET RECOMMENDATIONS:  Easy to chew consistency solids with  thin liquids MUST BE ALERT FOR INTAKE    If poor alertness continues may need to be NPO daughter in agreement with not attempting to feed patient unless she is alert and demonstrates oral awareness. FEEDING RECOMMENDATIONS:     Assistance level:  Full assistance is needed during all oral intake      Compensatory strategies recommended: Small bites/sips, Alternate solids and liquids and Check for oral pocketing      Discussed recommendations with nursing and/or faxed report to referring provider: Yes    SPEECH THERAPY  PLAN OF CARE   The dysphagia POC is established based on physician order, dysphagia diagnosis and results of clinical assessment     Skilled SLP intervention for dysphagia management on acute care 3-5 x per week until goals met, pt plateaus in function and/or discharged from hospital    Conditions Requiring Skilled Therapeutic Intervention for dysphagia:    Patient is performing below her functional baseline d/t her current acute condition, Multiple diagnoses, multiple medications, and increased dependency upon caregivers.     Specific dysphagia interventions to include:     Training in positioning for improved integrity of swallow  Compensatory strategy training     Specific instructions for next treatment: therapeutic po trial to determine safety of current and or advanced diet textures and consistencies  Patient Treatment Goals:    Short Term Goals:  Pt will participate in ongoing evaluation of swallow function to determine if PO diet can be safely continued    Long Term Goals:   Pt will maintain adequate nutrition/hydration via PO intake of the least restrictive oral diet with implementation of safe swallow/ compensatory strategies and decrease signs/symptoms of aspiration to less than 1 x/day. Patient/family Goal:    To be able to eat/drink again    Plan of care discussed with Family   The Family understand(s) the diagnosis, prognosis and plan of care     Rehabilitation Potential/Prognosis: fair                    ADMITTING DIAGNOSIS: Pubic ramus fracture, right, closed, initial encounter ([address] [S32.591A]  Closed fracture of transverse process of lumbar vertebra, initial encounter ([address] [S32.009A]  Closed fracture of ramus of right pubis, initial encounter ([address] [S32.591A]  Closed fracture of sacrum, unspecified portion of sacrum, initial encounter ([address] [S32.10XA]    VISIT DIAGNOSIS:   Visit Diagnoses       Codes    Closed fracture of ramus of right pubis, initial encounter ([address]    -  Primary S32.591A    Closed fracture of sacrum, unspecified portion of sacrum, initial encounter ([address]     S32.10XA    Closed fracture of transverse process of lumbar vertebra, initial encounter ([address]     S32.009A           PATIENT REPORT/COMPLAINT: patient previously was tolerating a regular diet with thin liquids during this stay with min assist at times for self feeding. Per daughter she reports a change in alertness yesterday with the patient not being appropriate for PO items and this has continued today with patient sleeping soundly currently.     meal tray present during evaluation     PRIOR LEVEL OF SWALLOW FUNCTION:    PAST HISTORY OF DYSPHAGIA?: none reported    Diet during hospital admission: Regular consistency solids with thin liquids    PROCEDURE:  Consistencies Administered During the Evaluation   Liquids: thin liquid--cold coated spoon only   Solids:  Not apprpriate      Method of Intake:   spoon  Fed by caregiver/family      Position:   Seated, upright    CLINICAL ASSESSMENT:  Oral Stage:       no oral awareness to cold coated spoon, pressure to tongue and no awareness when food items coated on her lips. Pharyngeal Stage:    Absent swallow    Cognition:    Non-verbal during this assessment. and frequent verbal and tactile cues did not help maintain alertness    Oral Peripheral Examination   Generalized oral weakness    Current Respiratory Status    room air     Parameters of Speech Production  Respiration:  Adequate for speech production  Quality:   Could not test  Intensity: Could not test    Volitional Swallow: absent     Volitional Cough:   absent     Pain: No pain reported. EDUCATION:   The Speech Language Pathologist (SLP) completed education regarding results of evaluation and that intervention is warranted at this time. Learner: Family  Education: Reviewed results and recommendations of this evaluation, Reviewed signs, symptoms and risks of aspiration and Reviewed recommendations for follow-up  Evaluation of Education:  Verbalizes understanding --family     This plan may be re-evaluated and revised as warranted. Evaluation Time includes thorough review of current medical information, gathering information on past medical history/social history and prior level of function, completion of standardized testing/informal observation of tasks, assessment of data and education on plan of care and goals. [x]The admitting diagnosis and active problem list, have been reviewed prior to initiation of this evaluation.         ACTIVE PROBLEM LIST:   Patient Active Problem List   Diagnosis    Vasovagal near syncope    Tension headache    Tremor    TMJ (temporomandibular joint disorder)    Cervical muscle pain    Pubic ramus fracture, right, closed, initial encounter ([facility]    Altered mental status         CPT code:  07166  bedside swallow christofer Cisneros MSCCC/SLP  Speech Language Pathologist  [id number]

## 2021-09-22 NOTE — PROGRESS NOTES
Physical Therapy        0321/0321-02    Patient unavailable for physical therapy treatment due to patient having EEG done in room, will attempt back at later time/date.     Lilliana Pulse, PTA  #[id number]

## 2021-09-22 NOTE — PROGRESS NOTES
Progress Note  Date:2021       Room:0321/0321-02  Patient Bay Castaneda     YOB: 1926     Age:89 y.o. Subjective    Subjective:  Symptoms:  Stable. She reports malaise and weakness. (Fevers last night, cultures pending, cxr negative,  Proteus UTI POA from [facility] , sensitive to rocephin. Lethargic ). Diet:  Poor intake. Activity level: Impaired due to weakness. Pain:  She reports no pain. Review of Systems   Constitutional: Positive for fever. Neurological: Positive for weakness. All other systems reviewed and are negative. Objective         Vitals Last 24 Hours:  TEMPERATURE:  Temp  Av.7 °F (37.6 °C)  Min: 98.3 °F (36.8 °C)  Max: 102.4 °F (39.1 °C)  RESPIRATIONS RANGE: Resp  Av  Min: 16  Max: 20  PULSE OXIMETRY RANGE: SpO2  Av.3 %  Min: 94 %  Max: 96 %  PULSE RANGE: Pulse  Av.8  Min: 102  Max: 118  BLOOD PRESSURE RANGE: Systolic (69NZM), ANI:993 , Min:121 , SKA:956   ; Diastolic (15XFP), OXM:08, Min:66, Max:85    I/O (24Hr): Intake/Output Summary (Last 24 hours) at 2021 0903  Last data filed at 2021 1945  Gross per 24 hour   Intake 0 ml   Output 300 ml   Net -300 ml     Objective:  General Appearance:  Ill-appearing. Vital signs: (most recent): Blood pressure (!) 170/82, pulse 108, temperature 99.6 °F (37.6 °C), temperature source Axillary, resp. rate 16, height 5' 6\" (1.676 m), weight 146 lb (66.2 kg), SpO2 94 %. Vital signs are normal.  Fever. Output: Producing urine and producing stool. Lungs:  Normal effort and normal respiratory rate. Heart: Normal rate. Regular rhythm. Abdomen: Abdomen is soft. Bowel sounds are normal.   There is no abdominal tenderness. Extremities: Decreased range of motion. Pulses: Distal pulses are intact. Skin:  Warm and dry.       Labs/Imaging/Diagnostics    Labs:  CBC:  Recent Labs     21  0558 21  0349   WBC 14.0* 13.7*   RBC 3.65 3.62   HGB 11.8 11.7 HCT 34.1 34.6   MCV 93.4 95.6   RDW 12.6 12.9    304     CHEMISTRIES:  Recent Labs     09/21/21  0558 09/22/21  0349    135   K 4.4 3.9    100   CO2 25 25   BUN 16 19   CREATININE 0.6 0.6   GLUCOSE 138* 168*     PT/INR:No results for input(s): PROTIME, INR in the last 72 hours. APTT:No results for input(s): APTT in the last 72 hours. LIVER PROFILE:No results for input(s): AST, ALT, BILIDIR, BILITOT, ALKPHOS in the last 72 hours. Imaging Last 24 Hours:  CT HEAD WO CONTRAST    Result Date: 9/21/2021  EXAMINATION: CT OF THE HEAD WITHOUT CONTRAST  9/21/2021 12:52 pm TECHNIQUE: CT of the head was performed without the administration of intravenous contrast. Dose modulation, iterative reconstruction, and/or weight based adjustment of the mA/kV was utilized to reduce the radiation dose to as low as reasonably achievable. COMPARISON: None. HISTORY: ORDERING SYSTEM PROVIDED HISTORY: altered mental status/rule out stroke TECHNOLOGIST PROVIDED HISTORY: Reason for exam:->altered mental status/rule out stroke Has a \"code stroke\" or \"stroke alert\" been called? ->No FINDINGS: BRAIN/VENTRICLES: The ventricles and subarachnoid spaces are somewhat prominent suggesting parenchymal volume loss. There is bilateral white matter lucency which is nonspecific but likely represents chronic microvascular ischemic change. There is no radiographic evidence for intra-axial or extra-axial fluid collection, no mass, nor hematoma. ORBITS: The visualized portion of the orbits demonstrate no acute abnormality. SINUSES: The visualized paranasal sinuses and mastoid air cells demonstrate no acute abnormality. SOFT TISSUES/SKULL:  No acute abnormality of the visualized skull or soft tissues. No acute intracranial abnormality. Parenchymal volume loss is likely age related. Moderate white matter lucency is nonspecific but likely represents chronic microvascular ischemic change.  If there is persistent neurological change

## 2021-09-22 NOTE — CONSULTS
patient mental status     Medications Prior to Admission: acetaminophen (TYLENOL) 500 MG tablet, Take 1,000 mg by mouth every 6 hours as needed for Pain  loratadine (CLARITIN) 10 MG tablet, Take 10 mg by mouth daily as needed (Allergies)  melatonin 5 MG TABS tablet, Take 5 mg by mouth nightly  pantoprazole (PROTONIX) 40 MG tablet, Take 40 mg by mouth daily  trolamine salicylate (ASPERCREME/ALOE) 10 % cream, Apply 1 each topically as needed for Pain  ciprofloxacin (CIPRO) 250 MG tablet, Take 250 mg by mouth 2 times daily  polyethylene glycol (GLYCOLAX) 17 GM/SCOOP powder, Take 17 g by mouth daily as needed (Constipation)  escitalopram (LEXAPRO) 20 MG tablet, Take 20 mg by mouth daily  haloperidol (HALDOL) 0.5 MG tablet, Take 0.5 mg by mouth daily  carboxymethylcellulose PF (REFRESH PLUS) 0.5 % SOLN ophthalmic solution, Place 1 drop into both eyes as needed (Dry Eyes)  sulfamethoxazole-trimethoprim (BACTRIM DS;SEPTRA DS) 800-160 MG per tablet, Take 1 tablet by mouth 2 times daily  diclofenac (VOLTAREN) 50 MG EC tablet, Take 50 mg by mouth 3 times daily  clopidogrel (PLAVIX) 75 MG tablet, Take 75 mg by mouth every other day.   Multiple Vitamins-Minerals (CENTRUM SILVER PO), Take 1 tablet by mouth daily   CURRENT MEDICATIONS     Current Facility-Administered Medications:     losartan (COZAAR) tablet 50 mg, 50 mg, Oral, Daily, Anton Prieto DO    acetaminophen (TYLENOL) suppository 650 mg, 650 mg, Rectal, Q4H PRN, [place] Carlo Prieto DO, 650 mg at 09/22/21 0514    dextrose 5 % and 0.45 % sodium chloride infusion, , IntraVENous, Continuous, Thierry Benton DO, Last Rate: 100 mL/hr at 09/21/21 2008, New Bag at 09/21/21 2008    Soothe XP Xtra Protection SOLN 1 drop, 1 drop, Both Eyes, PRN, Thierry Benton DO    polyethylene glycol (GLYCOLAX) packet 17 g, 17 g, Oral, BID, Jose Gutierrez MD, 17 g at 09/20/21 1019    cefTRIAXone (ROCEPHIN) 1,000 mg in sterile water 10 mL IV syringe, 1,000 mg, IntraVENous, Q24H, Lab SARS-CoV-2, NAAT (no units)   Date Value   01/29/2021 Not Detected        PAST MEDICAL HISTORY     Past Medical History:   Diagnosis Date    Anxiety     Bladder incontinence     Hyperlipidemia     Osteoarthritis     Sinus infection      SURGICAL HISTORY       Past Surgical History:   Procedure Laterality Date    APPENDECTOMY      EYE SURGERY Left 11/2016    cataract    FOOT SURGERY      HIP FRACTURE SURGERY      HYSTERECTOMY      KNEE ARTHROSCOPY      ROTATOR CUFF REPAIR      TONSILLECTOMY       FAMILY HISTORY       Family History   Problem Relation Age of Onset    Heart Disease Mother     Heart Disease Father     High Blood Pressure Sister     High Cholesterol Sister     High Cholesterol Brother      SOCIAL HISTORY       Social History     Socioeconomic History    Marital status:      Spouse name: None    Number of children: None    Years of education: None    Highest education level: None   Occupational History    None   Tobacco Use    Smoking status: Never Smoker    Smokeless tobacco: Never Used   Substance and Sexual Activity    Alcohol use: No    Drug use: No    Sexual activity: None   Other Topics Concern    None   Social History Narrative    None     Social Determinants of Health     Financial Resource Strain:     Difficulty of Paying Living Expenses:    Food Insecurity:     Worried About Running Out of Food in the Last Year:     Ran Out of Food in the Last Year:    Transportation Needs:     Lack of Transportation (Medical):      Lack of Transportation (Non-Medical):    Physical Activity:     Days of Exercise per Week:     Minutes of Exercise per Session:    Stress:     Feeling of Stress :    Social Connections:     Frequency of Communication with Friends and Family:     Frequency of Social Gatherings with Friends and Family:     Attends Buddhism Services:     Active Member of Clubs or Organizations:     Attends Club or Organization Meetings:     Marital Status:    Intimate Partner Violence:     Fear of Current or Ex-Partner:     Emotionally Abused:     Physically Abused:     Sexually Abused:      PHYSICAL EXAM       ED Triage Vitals   BP Temp Temp Source Pulse Resp SpO2 Height Weight   09/16/21 1529 09/16/21 1529 09/16/21 1529 09/16/21 1529 09/16/21 1529 09/16/21 1529 09/16/21 2300 09/16/21 2300   (!) 141/66 98.2 °F (36.8 °C) Oral 75 18 98 % 5' 6\" (1.676 m) 146 lb (66.2 kg)     Vitals:    Vitals:    09/21/21 1645 09/21/21 1845 09/22/21 0045 09/22/21 0630   BP: 137/77 121/66  (!) 170/82   Pulse: 115 118  108   Resp: 20 20 16   Temp: 99.3 °F (37.4 °C) 102.4 °F (39.1 °C) 100.2 °F (37.9 °C) 99.6 °F (37.6 °C)   TempSrc: Oral Oral Axillary Axillary   SpO2: 96%   94%   Weight:       Height:         Physical Exam   Constitutional/General: Alert and oriented, NAD  Head: NC/AT  Eyes: PERRL, EOMI  Mouth: Normal mucosa, no thrush   Neck: Supple, full ROM,    Pulmonary: Lungs clear to auscultation bilaterally. Not in respiratory distress  Cardiovascular:  Regular rate and rhythm, no murmurs, gallops, or rubs. Abdomen: Soft, + BS. No distension. Nontender. Extremities: Moves all extremities x 4. Warm and well perfused  Skin: Warm and dry without rash  Neurologic:    No focal deficits  Psych: Normal Affect     DIAGNOSTIC RESULTS   RADIOLOGY:   XR PELVIS (1-2 VIEWS)    Result Date: 9/16/2021  EXAMINATION: ONE XRAY VIEW OF THE PELVIS 9/16/2021 3:55 pm COMPARISON: None. HISTORY: ORDERING SYSTEM PROVIDED HISTORY: fall TECHNOLOGIST PROVIDED HISTORY: Reason for exam:->fall FINDINGS: There is mildly displaced and comminuted right superior pubic ramus fracture. Hardware in the visualized proximal right femur. Degenerative changes in the visualized lumbar spine. Comminuted right superior pubic ramus fracture.      CT HEAD WO CONTRAST    Result Date: 9/21/2021  EXAMINATION: CT OF THE HEAD WITHOUT CONTRAST  9/21/2021 12:52 pm TECHNIQUE: CT of the head was performed without the administration of intravenous contrast. Dose modulation, iterative reconstruction, and/or weight based adjustment of the mA/kV was utilized to reduce the radiation dose to as low as reasonably achievable. COMPARISON: None. HISTORY: ORDERING SYSTEM PROVIDED HISTORY: altered mental status/rule out stroke TECHNOLOGIST PROVIDED HISTORY: Reason for exam:->altered mental status/rule out stroke Has a \"code stroke\" or \"stroke alert\" been called? ->No FINDINGS: BRAIN/VENTRICLES: The ventricles and subarachnoid spaces are somewhat prominent suggesting parenchymal volume loss. There is bilateral white matter lucency which is nonspecific but likely represents chronic microvascular ischemic change. There is no radiographic evidence for intra-axial or extra-axial fluid collection, no mass, nor hematoma. ORBITS: The visualized portion of the orbits demonstrate no acute abnormality. SINUSES: The visualized paranasal sinuses and mastoid air cells demonstrate no acute abnormality. SOFT TISSUES/SKULL:  No acute abnormality of the visualized skull or soft tissues. No acute intracranial abnormality. Parenchymal volume loss is likely age related. Moderate white matter lucency is nonspecific but likely represents chronic microvascular ischemic change. If there is persistent neurological change consider follow-up CT or MRI. CT HEAD WO CONTRAST    Result Date: 9/16/2021  EXAMINATION: CT OF THE HEAD WITHOUT CONTRAST  9/16/2021 3:41 pm TECHNIQUE: CT of the head was performed without the administration of intravenous contrast. Dose modulation, iterative reconstruction, and/or weight based adjustment of the mA/kV was utilized to reduce the radiation dose to as low as reasonably achievable. COMPARISON: None. HISTORY: ORDERING SYSTEM PROVIDED HISTORY: Trauma TECHNOLOGIST PROVIDED HISTORY: Has a \"code stroke\" or \"stroke alert\" been called? ->No Reason for exam:->Trauma Decision Support Exception - unselect if not a suspected or confirmed emergency medical condition->Emergency Medical Condition (MA) FINDINGS: BRAIN/VENTRICLES: There is no acute intracranial hemorrhage, mass effect or midline shift. No abnormal extra-axial fluid collection. The gray-white differentiation is maintained without evidence of an acute infarct. There is no evidence of hydrocephalus. The ventricles, cisterns and sulci are prominent consistent with atrophy. There is decreased attenuation within the periventricular white matter consistent with periventricular leukomalacia. ORBITS: The visualized portion of the orbits demonstrate no acute abnormality. SINUSES: The visualized paranasal sinuses and mastoid air cells demonstrate no acute abnormality. SOFT TISSUES/SKULL:  No acute abnormality of the visualized skull or soft tissues. 1. There is no acute intracranial abnormality. Specifically, there is no intracranial hemorrhage. 2. Advanced atrophy and periventricular leukomalacia,     CT CHEST W CONTRAST    Result Date: 9/17/2021  EXAMINATION: CT OF THE CHEST WITH CONTRAST 9/17/2021 5:26 am TECHNIQUE: CT of the chest was performed with the administration of intravenous contrast. Multiplanar reformatted images are provided for review. Dose modulation, iterative reconstruction, and/or weight based adjustment of the mA/kV was utilized to reduce the radiation dose to as low as reasonably achievable. COMPARISON: None. HISTORY: ORDERING SYSTEM PROVIDED HISTORY: trauma TECHNOLOGIST PROVIDED HISTORY: Reason for exam:->trauma FINDINGS: Mediastinum: There are coronary artery calcifications. There is no abnormal mediastinal or hilar mass seen. There is no abnormal mediastinal air or abnormal mediastinal fluid collection. Lungs/pleura: There is no acute infiltrate. There is no pleural effusion or pneumothorax. Upper Abdomen: No acute abnormality seen in visualized upper abdomen. Soft Tissues/Bones: There is pectus excavatum deformity of the anterior chest wall.      No acute post-traumatic abnormality seen in the chest.     CT CERVICAL SPINE WO CONTRAST    Result Date: 9/17/2021  EXAMINATION: CT OF THE CERVICAL SPINE WITHOUT CONTRAST 9/17/2021 5:26 am TECHNIQUE: CT of the cervical spine was performed without the administration of intravenous contrast. Multiplanar reformatted images are provided for review. Dose modulation, iterative reconstruction, and/or weight based adjustment of the mA/kV was utilized to reduce the radiation dose to as low as reasonably achievable. COMPARISON: Cervical spine CT May 4, 2019 HISTORY: ORDERING SYSTEM PROVIDED HISTORY: trauma TECHNOLOGIST PROVIDED HISTORY: Reason for exam:->trauma FINDINGS: BONES/ALIGNMENT: There is no acute fracture or traumatic malalignment. DEGENERATIVE CHANGES: No change in multilevel degenerative disc disease seen throughout the cervical spine. SOFT TISSUES: There is no prevertebral soft tissue swelling. No traumatic injuries of cervical spine     CT PELVIS WO CONTRAST Additional Contrast? None    Result Date: 9/16/2021  EXAMINATION: CT OF THE PELVIS WITHOUT CONTRAST 9/16/2021 5:19 pm TECHNIQUE: CT of the pelvis was performed without the administration of intravenous contrast.  Multiplanar reformatted images are provided for review. Adjustment of mA and/or kV according to patient size was utilized. Dose modulation, iterative reconstruction, and/or weight based adjustment of the mA/kV was utilized to reduce the radiation dose to as low as reasonably achievable. COMPARISON: AP pelvis 09/16/2021 at 1555 hours.  HISTORY: ORDERING SYSTEM PROVIDED HISTORY: pubic ramus fracture TECHNOLOGIST PROVIDED HISTORY: Reason for exam:->pubic ramus fracture Additional Contrast?->None Decision Support Exception - unselect if not a suspected or confirmed emergency medical condition->Emergency Medical Condition (MA) FINDINGS: There is comminuted fracture of the right parasymphyseal pubis with comminuted, segmental fracture in the right inferior pubic ramus and comminuted fracture of the right superior pubic ramus. There is also vertically oriented, distracted fracture through the right sacral ala. There is minimally displaced fracture at the right L5 transverse process. There is degenerative disc disease at L4-5 and L5-S1 as well as bilateral facet arthropathy at both levels. There is intramedullary nail and compression screw in the right femur. The urinary bladder is distended with mild thickening of the wall which may represent chronic cystitis. There is a large amount of stool throughout the visualized portions of the colon. There is colonic diverticulosis. There is atherosclerotic disease. 1. Comminuted fractures at the right parasymphyseal pubis as well as the superior and inferior pubic rami. 2. Longitudinal fracture involving the right sacral ala. 3. Minimally displaced fracture at the right L5 transverse process. CT ABDOMEN PELVIS W IV CONTRAST Additional Contrast? None    Result Date: 9/17/2021  EXAMINATION: CT OF THE ABDOMEN AND PELVIS WITH CONTRAST 9/17/2021 5:26 am TECHNIQUE: CT of the abdomen and pelvis was performed with the administration of intravenous contrast. Multiplanar reformatted images are provided for review. Dose modulation, iterative reconstruction, and/or weight based adjustment of the mA/kV was utilized to reduce the radiation dose to as low as reasonably achievable. COMPARISON: CT pelvis of 09/16/2021 HISTORY: ORDERING SYSTEM PROVIDED HISTORY: trauma TECHNOLOGIST PROVIDED HISTORY: Additional Contrast?->None Reason for exam:->trauma FINDINGS: Lower Chest: The visualized portions of the lung bases are clear. There is pectus excavatum deformity of the lower thorax. Organs: There are calcifications throughout pancreas which are consistent with prior episodes of pancreatitis. There no findings to suggest acute pancreatitis. The liver, spleen, adrenal glands and kidneys demonstrate no acute abnormality. There is a 5 cm right renal cyst.  There is no evidence for solid organ injury. GI/Bowel: There are no findings of intestinal obstruction. There is prominent amount of stool throughout the colon. Correlate for constipation. The appendix is not visualized. There is sigmoid diverticulosis. There no acute diverticulitis. Gallbladder appears grossly unremarkable. Pelvis: There is a Germain catheter in the bladder. There is diffuse bladder wall thickening concerning for cystitis. Coronal images suggest possible mucosal mass along the right side. Bladder mass not excluded. There is additional hyperdensity in the bladder which is probably blood clots. Peritoneum/Retroperitoneum: There are aortoiliac atherosclerotic calcification. There is no abdominal aortic aneurysm. There is no free intraperitoneal air. There is no abnormal fluid collection. Bones/Soft Tissues: Prominent lumbar degenerative changes. There are displaced fractures of the right pubic rami. There is also fracture through the right sacral ala. There appears to be bony resorption along fracture margins suggesting that fractures are more likely subacute than acute. Correlate with history. There is a fracture of L5 right transverse process of unknown age, either acute or subacute. 1. No solid organ injury seen. 2. Pancreatic calcifications are consistent with the wall episodes of pancreatitis. There is no evidence for acute pancreatitis. 3. Large amount of stool throughout colon. Correlate constipation. 4. Sigmoid diverticulosis. No acute diverticulitis seen. 5. Abnormal bladder. There is diffuse wall thickening which could be cystitis. There is some focal mucosal thickening on the right side. A bladder mass not excluded. Hyperdensity in the bladder is probably blood clots. 6. Pelvic fractures including displaced right pubic rami fractures and fracture involving right sacral ala.   There is bony resorption along the fracture margins 135   K 4.4   < > 3.9      < > 100   CO2 25   < > 25   BUN 16  --  19   CREATININE 0.6  --  0.6   GFRAA >60  --  >60   LABGLOM >60  --  >60   GLUCOSE 138*  --  168*   CALCIUM 9.0  --  8.6    < > = values in this interval not displayed. Lab Results   Component Value Date    SEDRATE 11 04/05/2013    SEDRATE 12 02/17/2013    SEDRATE 3 10/31/2011     Lab Results   Component Value Date    COVID19 Not Detected 01/29/2021     COVID-19/BILLY-COV2 LABS  No results for input(s): CRP, PROCAL, FERRITIN, LDH, TROPONINI, DDIMER, FIBRINOGEN, INR, PROTIME, AST, ALT, TRIG in the last 72 hours. Lab Results   Component Value Date    CHOL 209 10/11/2011    TRIG 106 10/11/2011    HDL 81.0 10/11/2011    LDLCALC 107 10/11/2011     MICROBIOLOGY:     Cultures :   Lab Results   Component Value Date    BC 5 Days no growth 01/29/2021     Lab Results   Component Value Date    BLOODCULT2 5 Days no growth 01/29/2021     Urine Culture, Routine   Date Value Ref Range Status   09/17/2021 Growth not present  Final       FINAL IMPRESSION    Patient is a 80 y.o. female who presented with   Chief Complaint   Patient presents with    Fall     pt fell from a standing position. pt has no complaints of pain and does not remember the fall due to her dementia   · Hematuria   · Fevers  · Leukocytosis   · S/P fall- Pelvic fracture/ lumbar fracture        Plan:   · Currently on Rocephin IV since 9/17/2021- will broaden to Zosyn for now  · Check fungitell   · Check cultures   · Check respiratory array panel  · Urology following   · Palliative care consult  · Monitor labs - check procal, check sed rate       Imaging and labs were reviewed per medical records and any ID pertinent labs were addressed with the patient. The patient/FAMILY  was educated about the diagnosis, prognosis, indications, risks and benefits of treatment. An opportunity to ask questions was given to the patient/FAMILY and questions were answered.       Thank you for involving

## 2021-09-22 NOTE — PROGRESS NOTES
Physical Therapy    Physical Therapy Treatment Note/Plan of Care    Room #:  0321/0321-02  Patient Name: Tanisha Alcaraz  YOB: 1926  MRN: [id number]    Date of Service: 9/22/2021     Tentative placement recommendation: Bravo Nelson with Physical Therapy   Equipment recommendation: To be determined      Evaluating Physical Therapist: Zohra Alex  #[id number]      Specific Provider Orders/Date/Referring Provider :  09/16/21 2345   PT eval and treat Start: 09/16/21 2345, End: 09/16/21 2345, ONE TIME, Standing Count: 1 Occurrences, R    Anton Prieto, DO  Ortho consult: pwb 50% with walker  right-sided superior pubic rami fracture is comminuted and displaced.     Admitting Diagnosis:   Pubic ramus fracture, right, closed, initial encounter ([address] [S32.591A]  Closed fracture of transverse process of lumbar vertebra, initial encounter ([address] [S32.009A]  Closed fracture of ramus of right pubis, initial encounter ([address] [S32.591A]  Closed fracture of sacrum, unspecified portion of sacrum, initial encounter ([address] [S32.10XA]       Surgery: none  Visit Diagnoses       Codes    Closed fracture of ramus of right pubis, initial encounter [facility])    -  Primary S32.591A    Closed fracture of sacrum, unspecified portion of sacrum, initial encounter ([address]     S32.10XA    Closed fracture of transverse process of lumbar vertebra, initial encounter ([address]     S32.009A          Patient Active Problem List   Diagnosis    Vasovagal near syncope    Tension headache    Tremor    TMJ (temporomandibular joint disorder)    Cervical muscle pain    Pubic ramus fracture, right, closed, initial encounter ([address]    Altered mental status        ASSESSMENT of Current Deficits Patient exhibits decreased strength, balance, endurance, range of motion and coordination impairing functional mobility, transfers, gait  and tolerance to activity are barriers to d/c and require skilled intervention during hospital stay to attain pre hospital level of function. Patient able to open eyes today and resistive for some movements, however overall, PROM for supine exercises. Will need skilled intervention to regain prior level of function      PHYSICAL THERAPY  PLAN OF CARE       Physical therapy plan of care is established based on physician order,  patient diagnosis and clinical assessment    Current Treatment Recommendations:    -Bed Mobility: Lower extremity exercises , Upper extremity exercises  and Trunk control activities   -Sitting Balance: Incorporate reaching activities to activate trunk muscles  and Hands on support to maintain midline   -Standing Balance: Perform strengthening exercises in standing to promote motor control with or without upper extremity support  and Instruct patient on adequate base of support to maintain balance  -Transfers: Provide instruction on proper hand and foot position for adequate transfer of weight onto lower extremities and use of gait device if needed and Cues for hand placement, technique and safety. Provide stabilization to prevent fall   -Gait: Gait training and Standing activities to improve: base of support, weight shift, weight bearing    -Endurance: Utilize Supervised activities to increase level of endurance to allow for safe functional mobility including transfers and gait     PT long term treatment goals are located in below grid    Patient and or family understand(s) diagnosis, prognosis, and plan of care. Frequency of treatments: Patient will be seen  daily.          Prior Level of Function: Patient ambulated with wheeled walker    Rehab Potential: good    for baseline    Past medical history:   Past Medical History:   Diagnosis Date    Anxiety     Bladder incontinence     Hyperlipidemia     Osteoarthritis     Sinus infection      Past Surgical History:   Procedure Laterality Date    APPENDECTOMY      EYE SURGERY Left 11/2016    cataract    FOOT SURGERY      HIP FRACTURE SURGERY  HYSTERECTOMY      KNEE ARTHROSCOPY      ROTATOR CUFF REPAIR      TONSILLECTOMY         SUBJECTIVE:    Precautions: , falls, alarm and dementia ,    Social history: Patient lives in an assisted living facility      Equipment owned: [address],       [address] [facility]  Mobility Inpatient   How much difficulty turning over in bed?: Unable  How much difficulty sitting down on / standing up from a chair with arms?: Unable  How much difficulty moving from lying on back to sitting on side of bed?: Unable  How much help from another person moving to and from a bed to a chair?: Total  How much help from another person needed to walk in hospital room?: Total  How much help from another person for climbing 3-5 steps with a railing?: Total  AM-PAC Inpatient Mobility Raw Score : 6  AM-PAC Inpatient T-Scale Score : 23.55  Mobility Inpatient CMS 0-100% Score: 100  Mobility Inpatient CMS G-Code Modifier : CN    Nursing cleared patient for PT treatment. OBJECTIVE;   Initial Evaluation  Date: 9/16/2021 Treatment Date:  9/22/2021     Short Term/ Long Term   Goals   Was pt agreeable to Eval/treatment? Yes yes To be met in 3 days   Pain level   0/10    No pain reported    Bed Mobility    Rolling: Maximal assist of 1    Supine to sit: Maximal assist of 1    Sit to supine: Maximal assist of 1    Scooting: Dependent assist of 1   Rolling: Not assessed    Supine to sit: Not assessed    Sit to supine: Not assessed    Scooting: Not assessed     Rolling: Minimal assist of 1    Supine to sit: Minimal assist of 1    Sit to supine: Minimal assist of 1    Scooting: Minimal assist of 1     Transfers Sit to stand: Dependent assist of 1 x 2 reps  Sit to stand: Not assessed      Sit to stand:  Moderate assist of 1     Ambulation    not assessed  not assessed     15 feet using  wheeled walker with Moderate assist of 1    ROM Within functional limits    Increase range of motion 10% of affected joints    Strength BUE: refer to OT eval  RLE:   3-/5  LLE:  3-/5  Increase strength in affected mm groups by 1/3 grade   Balance Sitting EOB:  poor    Dynamic Standing:  poor   Sitting EOB: not assessed   Dynamic Standing: not assessed    Sitting EOB:  fair     Dynamic Standing: fair       Patient is Alert & Oriented x person and follows one step directions  Intermittently impaired by hard of hearing and demenita  Sensation:  Patient  denies numbness/tingling   Edema:  no   Endurance: poor         Patient education  Patient educated on role of Physical Therapy, risks of immobility, safety and plan of care and  importance of mobility while in hospital      Patient response to education:   Pt verbalized understanding Pt demonstrated skill Pt requires further education in this area   No No Yes      Treatment:  Patient practiced and was instructed/facilitated in the following treatment: Patient assisted with supine exercises as below, PROM with some resistance from patient. Patient would bend bilateral knees and move legs over to R side, needing cues to straighten legs for appropriate position. Foam heel floaters donned post tx. Therapeutic Exercises:  ankle pumps, heel slide, hip abduction/adduction and straight leg raise  x 10-15 reps. At end of session, patient in bed with alarm call light and phone within reach,  all lines and tubes intact, nursing notified. Patient would benefit from continued skilled Physical Therapy to improve functional independence and quality of life.          Patient's/ family goals   home    Time in  318  Time out  331    Total Treatment Time  13 minutes    CPT codes:  Therapeutic exercises (12714)   13 minutes  1 unit(s)    Camryn Alberto, [facility]  #[id number]

## 2021-09-22 NOTE — CONSULTS
History Of Present Illness: 80 y female from NH. Lilliana Dural from standing position. Sustained an extensive pelvic fracture and lumbar fracture. Also has gross hematuria and UTI. Poor historian. Advanced dementia. Rest of history obtained from medical record  As above per Dr Sierra Palomino. The patient is a 80 y.o. female with significant past medical history of dementia who presents with above. The patient has the following symptoms:    Change in level of consciousness: somnolent    New Weakness: no    Numbness or Tingling: no    Difficulty Swallowing: no    Current Medications:   Scheduled Meds:   losartan  50 mg Oral Daily    polyethylene glycol  17 g Oral BID    cefTRIAXone (ROCEPHIN) IV  1,000 mg IntraVENous Q24H    sodium chloride flush  5-40 mL IntraVENous 2 times per day    bisacodyl  10 mg Rectal Daily    [Held by provider] enoxaparin  40 mg SubCUTAneous Daily    [Held by provider] clopidogrel  75 mg Oral Every Other Day    escitalopram  20 mg Oral Daily    haloperidol  0.5 mg Oral Daily    cetirizine  10 mg Oral Daily    melatonin  5 mg Oral Nightly    pantoprazole  40 mg Oral Daily     Continuous Infusions:   dextrose 5 % and 0.45 % NaCl 100 mL/hr at 09/21/21 2008    sodium chloride       PRN Meds:acetaminophen, Soothe XP Xtra Protection, acetaminophen, ondansetron **OR** ondansetron, sodium chloride flush, sodium chloride, sodium phosphate, morphine **OR** morphine    Allergies:  Zoloft [sertraline hcl]    Social History:   TOBACCO:   reports that she has never smoked. She has never used smokeless tobacco.  ETOH:   reports no history of alcohol use.     Past Medical History:        Diagnosis Date    Anxiety     Bladder incontinence     Hyperlipidemia     Osteoarthritis     Sinus infection        Past Surgical History:        Procedure Laterality Date    APPENDECTOMY      EYE SURGERY Left 11/2016    cataract    FOOT SURGERY      HIP FRACTURE SURGERY      HYSTERECTOMY      KNEE ARTHROSCOPY      ROTATOR CUFF REPAIR      TONSILLECTOMY           Outside reports reviewed: ER records, historical medical records, lab reports and radiology reports. Patient's medications, allergies, past medical, surgical, social and family histories were reviewed and updated as appropriate. Review of Systems  A comprehensive review of systems was negative except for:       Objective:     Neuro exam 170/82 p 108 t 99.6  General: somnolent. Opens eyes partially and does not follow simple commands with no language output  Cranial nerve testing  unable to cooperate. PERRL, corneal reflexes +  . Funduscopic eye exam revealed not testable. Motor exam: . Marnell Hal Deep tendon reflexes were absent bilaterally. Plantar responses were flexor bilaterally. Cerebellar exam noted . Marnell Hal Sensation was . Marnell Hal Assessment:   Altered mental status multifactorial including underlying dementia with possible infection plus medication including antibiotic  Head ct negative  syncope      Plan:   EEG  Stop haldol for now (reasonable if significant agitation occurs)  Avoid benzodiazepines  Consider code status issues/palliative care.   Thanks for consult

## 2021-09-22 NOTE — CONSULTS
Palliative Care Department  [phone]  Palliative Care Initial Consult  Provider Nyasia Perez, [address]  [id number]  Hospital Day: 7  Date of Initial Consult: 9/22/2021  Referring Provider: Lynn Kessler DO  Palliative Medicine was consulted for assistance with: [address], code status, assistance with discharge planning    Brief Summary of Hospital Course:   Varinder Gao is a 80 y. o. with a medical history of advanced dementia who was admitted on 9/16/2021 from assisted living with a CHIEF COMPLAINT of fall. ASSESSMENT/PLAN:     Pertinent Hospital Problems      Fall   Pelvic fracture      Palliative Care Encounter / Counseling Regarding Goals of Care  Please see detailed goals of care discussion as below   At this time, Varinder Gao, Does Not have capacity for medical decision-making. Capacity is time limited and situation/question specific   During encounter omar Casarez was surrogate medical decision-maker   Outcome of goals of care meeting: consult hospice   Code status DNR-CC   Advanced Directives: no POA or living will in [facility]  [address] Surrogate/Legal NOK:  o Daughter Concepción Johnson [phone]  o Other Kenyon [place] [phone]    Spiritual assessment: no spiritual distress identified  Bereavement and grief: to be determined  Referrals to: none today    SUBJECTIVE:     Details of Conversation:  Chart reviewed. Called daughter/HCPENRIKE Casarez, discussed code status. Alfredo Casarez decided she wants to pursue comfort measures. Discussed possible hospice following, Alfredo Casarez very undecided about what she should do. States patient came from assisted living and she doesn't think they would be willing to take patient back with hospice, states RN at assisted living is Devika [phone] extension [phone].   Also interested in [place], doesn't think she wants patient to have to do SNF portion due to having pain with position changes and working with therapy, maybe interested in just having hospice following patient at [facility]. Told daughter would speak with  and find out if assisted living is a possibility and if [facility] would have a bed for patient with hospice. Daughter wants call back later with the options open to her. Physical Function:  PPS: 10    History Of Present Illness:    Per H&P  \"80 y female from NH. Aaron Loaiza from standing position. Sustained an extensive pelvic fracture and lumbar fracture. Also has gross hematuria and UTI. Poor historian. Advanced dementia. Rest of history obtained from medical record\"    Past Medical History:          Diagnosis Date    Anxiety     Bladder incontinence     Hyperlipidemia     Osteoarthritis     Sinus infection        Past Surgical History:          Procedure Laterality Date    APPENDECTOMY      EYE SURGERY Left 11/2016    cataract    FOOT SURGERY      HIP FRACTURE SURGERY      HYSTERECTOMY      KNEE ARTHROSCOPY      ROTATOR CUFF REPAIR      TONSILLECTOMY         Medications Prior to Admission:      Prior to Admission medications    Medication Sig Start Date End Date Taking?  Authorizing Provider   acetaminophen (TYLENOL) 500 MG tablet Take 1,000 mg by mouth every 6 hours as needed for Pain   Yes Historical Provider, MD   loratadine (CLARITIN) 10 MG tablet Take 10 mg by mouth daily as needed (Allergies)   Yes Historical Provider, MD   melatonin 5 MG TABS tablet Take 5 mg by mouth nightly   Yes Historical Provider, MD   pantoprazole (PROTONIX) 40 MG tablet Take 40 mg by mouth daily   Yes Historical Provider, MD   trolamine salicylate (ASPERCREME/ALOE) 10 % cream Apply 1 each topically as needed for Pain   Yes Historical Provider, MD   ciprofloxacin (CIPRO) 250 MG tablet Take 250 mg by mouth 2 times daily   Yes Historical Provider, MD   polyethylene glycol (GLYCOLAX) 17 GM/SCOOP powder Take 17 g by mouth daily as needed (Constipation)   Yes Historical Provider, MD   escitalopram (LEXAPRO) 20 MG tablet Take 20 mg by mouth daily Yes Historical Provider, MD   haloperidol (HALDOL) 0.5 MG tablet Take 0.5 mg by mouth daily   Yes Historical Provider, MD   carboxymethylcellulose PF (REFRESH PLUS) 0.5 % SOLN ophthalmic solution Place 1 drop into both eyes as needed (Dry Eyes)   Yes Historical Provider, MD   sulfamethoxazole-trimethoprim (BACTRIM DS;SEPTRA DS) 800-160 MG per tablet Take 1 tablet by mouth 2 times daily   Yes Historical Provider, MD   diclofenac (VOLTAREN) 50 MG EC tablet Take 50 mg by mouth 3 times daily   Yes Historical Provider, MD   clopidogrel (PLAVIX) 75 MG tablet Take 75 mg by mouth every other day. Yes Historical Provider, MD   Multiple Vitamins-Minerals (CENTRUM SILVER PO) Take 1 tablet by mouth daily  3/8/11  Yes Historical Provider, MD       Allergies:  Zoloft [sertraline hcl]    Social History:      The patient currently lives at assisted living    TOBACCO:   reports that she has never smoked. She has never used smokeless tobacco.  ETOH:   reports no history of alcohol use. Family History:       Reviewed in detail and positive as follows:        Problem Relation Age of Onset    Heart Disease Mother     Heart Disease Father     High Blood Pressure Sister     High Cholesterol Sister     High Cholesterol Brother        REVIEW OF SYSTEMS:   Pertinent positives as noted in the HPI. All other systems reviewed and negative.     OBJECTIVE:   Prognosis: Poor and Guarded    Physical Exam:  BP (!) 170/82   Pulse 108   Temp 99.6 °F (37.6 °C) (Axillary)   Resp 16   Ht 5' 6\" (1.676 m)   Wt 146 lb (66.2 kg)   SpO2 94%   BMI 23.57 kg/m²     Constitutional:  Elderly, cachectic, NAD  Eyes: normal lids, no discharge  ENMT:  Normocephalic, atraumatic, mucosa dry  Neck:  trachea midline, no JVD  Lungs:  CTA bilaterally, no audible rhonchi or wheezes noted, respirations unlabored, no retractions  Heart[de-identified]  RRR, distant heart tones, no murmur, rub, or gallop noted during exam  Abd:  Soft, non tender, non distended, bowel sounds present  :  Deferred, love  MSK: sarcopenia present  Ext:  Not visualized moving extremities, no edema, pulses present  Skin:  Warm and dry, no rashes on visible skin  Psych: unable to assess  Neuro:  Sleeping, not arousable to voice or light touch, not following commands    Objective data reviewed: labs, images, records, medication use, vitals and chart    Labs:     Recent Labs     09/21/21  0558 09/22/21  0349   WBC 14.0* 13.7*   HGB 11.8 11.7   HCT 34.1 34.6    304     Recent Labs     09/21/21  0558 09/22/21  0349    135   K 4.4 3.9    100   CO2 25 25   BUN 16 19   CREATININE 0.6 0.6   CALCIUM 9.0 8.6     No results for input(s): AST, ALT, BILIDIR, BILITOT, ALKPHOS in the last 72 hours. No results for input(s): INR in the last 72 hours. No results for input(s): Jadene Moh in the last 72 hours. Urinalysis:      Lab Results   Component Value Date    NITRU Negative 09/17/2021    WBCUA >20 09/17/2021    WBCUA 2-5 07/13/2011    BACTERIA MANY 09/17/2021    RBCUA PACKED 09/17/2021    RBCUA NONE 01/15/2014    BLOODU LARGE 09/17/2021    SPECGRAV 1.020 09/17/2021    GLUCOSEU Negative 09/17/2021    GLUCOSEU NEGATIVE 07/13/2011         Discussed patient and the plan of care with the other IDT members: Palliative Medicine IDT Team, Floor Nurse and Family    Time/Communication  Greater than 50% of time spent, total 50 minutes in counseling and coordination of care at the bedside regarding goals of care, diagnosis and prognosis and see above. Thank you for allowing Palliative Medicine to participate in the care of Tanisha Alcaraz.

## 2021-09-22 NOTE — CARE COORDINATION
CM note: Pt is now running fevers, waiting for blood culture results. Pt has been accepted at [facility] and BD Max covid is still pending. Liaison updated. PRECERT being waived by insurance provider d/t covid pandemic. HENS initiated by CM, will need completed when discharge written. FOX will need completed/signed by physician.

## 2021-09-22 NOTE — CARE COORDINATION
CM note: consult noted for Hospice, spoke with Dr. Destin Gaming with palliative medicine, daughter is quite overwhelmed at this time and unable to decide on a provider. Dr. Destin Gaming has requested CM gather info from [facility] and then inform daughter of her options. Spoke with nurse, Devika at [facility], she believes they would not be able to accept patient back if she requires the catheter but she will discuss with her superiors and call CM back. Per [facility] liaison, they are affiliated with [place]. She is also investigating if patient's Medicaid would cover the room and board if patient discharges here under Hospice. Await response.

## 2021-09-22 NOTE — CARE COORDINATION
CM note: Call placed to patients daughter/POA Myleneabrahan Hernandez re:hospice at [facility]. At this time Mylene Hernandez feels that patient would not be able to participate in therapy at facility. Requested a referral be made to [place] and plan remains [facility], however if patient appropriate for Hospice daughter will need informed of services/benefits/costs to make an informed decision.   Referral called to [facility] with [facility], faxed referral to [phone]

## 2021-09-23 VITALS
DIASTOLIC BLOOD PRESSURE: 79 MMHG | HEART RATE: 109 BPM | TEMPERATURE: 99.1 F | HEIGHT: 66 IN | RESPIRATION RATE: 16 BRPM | OXYGEN SATURATION: 100 % | BODY MASS INDEX: 19.44 KG/M2 | SYSTOLIC BLOOD PRESSURE: 116 MMHG | WEIGHT: 121 LBS

## 2021-09-23 PROBLEM — E43 SEVERE PROTEIN-CALORIE MALNUTRITION (HCC): Status: ACTIVE | Noted: 2021-09-23

## 2021-09-23 LAB
ANION GAP SERPL CALCULATED.3IONS-SCNC: 10 MMOL/L (ref 7–16)
BASOPHILS ABSOLUTE: 0.01 E9/L (ref 0–0.2)
BASOPHILS RELATIVE PERCENT: 0.1 % (ref 0–2)
BUN BLDV-MCNC: 13 MG/DL (ref 6–23)
CALCIUM SERPL-MCNC: 8.3 MG/DL (ref 8.6–10.2)
CHLORIDE BLD-SCNC: 99 MMOL/L (ref 98–107)
CO2: 23 MMOL/L (ref 22–29)
CREAT SERPL-MCNC: 0.5 MG/DL (ref 0.5–1)
EOSINOPHILS ABSOLUTE: 0.04 E9/L (ref 0.05–0.5)
EOSINOPHILS RELATIVE PERCENT: 0.3 % (ref 0–6)
GFR AFRICAN AMERICAN: >60
GFR NON-AFRICAN AMERICAN: >60 ML/MIN/1.73
GLUCOSE BLD-MCNC: 140 MG/DL (ref 74–99)
HCT VFR BLD CALC: 28.5 % (ref 34–48)
HEMOGLOBIN: 10 G/DL (ref 11.5–15.5)
IMMATURE GRANULOCYTES #: 0.05 E9/L
IMMATURE GRANULOCYTES %: 0.4 % (ref 0–5)
LYMPHOCYTES ABSOLUTE: 1.36 E9/L (ref 1.5–4)
LYMPHOCYTES RELATIVE PERCENT: 11.5 % (ref 20–42)
MCH RBC QN AUTO: 32.6 PG (ref 26–35)
MCHC RBC AUTO-ENTMCNC: 35.1 % (ref 32–34.5)
MCV RBC AUTO: 92.8 FL (ref 80–99.9)
MONOCYTES ABSOLUTE: 0.66 E9/L (ref 0.1–0.95)
MONOCYTES RELATIVE PERCENT: 5.6 % (ref 2–12)
NEUTROPHILS ABSOLUTE: 9.71 E9/L (ref 1.8–7.3)
NEUTROPHILS RELATIVE PERCENT: 82.1 % (ref 43–80)
PDW BLD-RTO: 12.4 FL (ref 11.5–15)
PLATELET # BLD: 280 E9/L (ref 130–450)
PMV BLD AUTO: 9 FL (ref 7–12)
POTASSIUM SERPL-SCNC: 3.3 MMOL/L (ref 3.5–5)
RBC # BLD: 3.07 E12/L (ref 3.5–5.5)
SODIUM BLD-SCNC: 132 MMOL/L (ref 132–146)
WBC # BLD: 11.8 E9/L (ref 4.5–11.5)

## 2021-09-23 PROCEDURE — 80048 BASIC METABOLIC PNL TOTAL CA: CPT

## 2021-09-23 PROCEDURE — 36415 COLL VENOUS BLD VENIPUNCTURE: CPT

## 2021-09-23 PROCEDURE — 6370000000 HC RX 637 (ALT 250 FOR IP): Performed by: FAMILY MEDICINE

## 2021-09-23 PROCEDURE — 2580000003 HC RX 258: Performed by: CLINICAL NURSE SPECIALIST

## 2021-09-23 PROCEDURE — 85025 COMPLETE CBC W/AUTO DIFF WBC: CPT

## 2021-09-23 PROCEDURE — 2580000003 HC RX 258: Performed by: FAMILY MEDICINE

## 2021-09-23 PROCEDURE — 6370000000 HC RX 637 (ALT 250 FOR IP): Performed by: STUDENT IN AN ORGANIZED HEALTH CARE EDUCATION/TRAINING PROGRAM

## 2021-09-23 PROCEDURE — 92526 ORAL FUNCTION THERAPY: CPT

## 2021-09-23 PROCEDURE — 6360000002 HC RX W HCPCS: Performed by: CLINICAL NURSE SPECIALIST

## 2021-09-23 RX ORDER — LOSARTAN POTASSIUM 50 MG/1
50 TABLET ORAL DAILY
Qty: 30 TABLET | Refills: 3 | Status: SHIPPED | OUTPATIENT
Start: 2021-09-24

## 2021-09-23 RX ADMIN — PANTOPRAZOLE SODIUM 40 MG: 40 TABLET, DELAYED RELEASE ORAL at 08:48

## 2021-09-23 RX ADMIN — PIPERACILLIN AND TAZOBACTAM 3375 MG: 3; .375 INJECTION, POWDER, FOR SOLUTION INTRAVENOUS at 16:11

## 2021-09-23 RX ADMIN — LOSARTAN POTASSIUM 50 MG: 50 TABLET, FILM COATED ORAL at 08:48

## 2021-09-23 RX ADMIN — SODIUM CHLORIDE 25 ML: 9 INJECTION, SOLUTION INTRAVENOUS at 02:58

## 2021-09-23 RX ADMIN — HALOPERIDOL 0.5 MG: 0.5 TABLET ORAL at 08:49

## 2021-09-23 RX ADMIN — DEXTROSE AND SODIUM CHLORIDE: 5; 450 INJECTION, SOLUTION INTRAVENOUS at 13:22

## 2021-09-23 RX ADMIN — CETIRIZINE HYDROCHLORIDE 10 MG: 10 TABLET, FILM COATED ORAL at 08:48

## 2021-09-23 RX ADMIN — PIPERACILLIN AND TAZOBACTAM 3375 MG: 3; .375 INJECTION, POWDER, FOR SOLUTION INTRAVENOUS at 00:02

## 2021-09-23 RX ADMIN — POLYETHYLENE GLYCOL 3350 17 G: 17 POWDER, FOR SOLUTION ORAL at 08:49

## 2021-09-23 RX ADMIN — ESCITALOPRAM OXALATE 20 MG: 10 TABLET ORAL at 08:48

## 2021-09-23 RX ADMIN — PIPERACILLIN AND TAZOBACTAM 3375 MG: 3; .375 INJECTION, POWDER, FOR SOLUTION INTRAVENOUS at 08:49

## 2021-09-23 RX ADMIN — BISACODYL 10 MG: 10 SUPPOSITORY RECTAL at 08:49

## 2021-09-23 RX ADMIN — SODIUM CHLORIDE 25 ML: 9 INJECTION, SOLUTION INTRAVENOUS at 13:15

## 2021-09-23 ASSESSMENT — ENCOUNTER SYMPTOMS
NAUSEA: 0
VOMITING: 0

## 2021-09-23 ASSESSMENT — PAIN SCALES - GENERAL: PAINLEVEL_OUTOF10: 1

## 2021-09-23 NOTE — PROGRESS NOTES
Comprehensive Nutrition Assessment    Type and Reason for Visit:  Initial, RD Nutrition Re-Screen/LOS    Nutrition Recommendations/Plan: Continue Current Diet, start Ensure BID and Boost Pudding daily for nutritional support    Nutrition Assessment:  Pt w/ pubic/lumbar fx 2/2 fall from standing. Noted UTI, parainfluenza 3. Hx dementia. SLP following, PO intakes declining and family requesting hospice consult. Will add ONS. Malnutrition Assessment:  Malnutrition Status:  Severe malnutrition    Context:  Chronic Illness     Findings of the 6 clinical characteristics of malnutrition:  Energy Intake:  7 - 75% or less estimated energy requirements for 1 month or longer  Weight Loss:  Unable to assess (lack of actual wt hx per EMR)     Body Fat Loss:  7 - Severe body fat loss Orbital, Triceps, Buccal region   Muscle Mass Loss:  7 - Severe muscle mass loss Temples (temporalis), Clavicles (pectoralis & deltoids), Calf (gastrocnemius), Thigh (quadraceps)  Fluid Accumulation:  No significant fluid accumulation     Strength:  Not Performed    Estimated Daily Nutrient Needs:  Energy (kcal):   ( X 1.2 SF); Weight Used for Energy Requirements:  Current     Protein (g):  70-85 (1.3-1.5); Weight Used for Protein Requirements:  Current        Fluid (ml/day):  ; Method Used for Fluid Requirements:  1 ml/kcal      Nutrition Related Findings:  dementia, -I&Os, no edema, abd WDL, K+ 3.3, SLP following      Wounds:  None       Current Nutrition Therapies:    ADULT DIET;  Regular    Anthropometric Measures:  · Height: 5' 6\" (167.6 cm)  · Current Body Weight: 121 lb (54.9 kg) (9/23 bed)   · Admission Body Weight: 121 lb (54.9 kg) (9/23 bed per flow sheet)    · Usual Body Weight:  (no actual wt hx per EMR, 1/29 129# estimated)     · Ideal Body Weight: 130 lbs; % Ideal Body Weight 93.1 %   · BMI: 19.5  · BMI Categories: Underweight (BMI less than 22) age over 72       Nutrition Diagnosis:   · Severe malnutrition, In context of chronic illness related to cognitive or neurological impairment (dementia) as evidenced by poor intake prior to admission, severe muscle loss, severe loss of subcutaneous fat, intake 0-25%    Nutrition Interventions:   Nutrition Education/Counseling:  Education not appropriate   Coordination of Nutrition Care:  Continue to monitor while inpatient    Goals:  Pt to consume >50% of meals       Nutrition Monitoring and Evaluation:   Food/Nutrient Intake Outcomes:  Food and Nutrient Intake, Supplement Intake  Physical Signs/Symptoms Outcomes:  Biochemical Data, Chewing or Swallowing, GI Status, Fluid Status or Edema, Nutrition Focused Physical Findings, Skin, Weight     Discharge Planning:     Too soon to determine     Electronically signed by Khoi Joseph RD, LD on 9/23/21 at 11:41 AM EDT    Contact: [phone]

## 2021-09-23 NOTE — PROGRESS NOTES
SPEECH LANGUAGE PATHOLOGY  DAILY PROGRESS NOTE        PATIENT NAME:  Gurpreet Davis      :  1926          TODAY'S DATE:  2021 ROOM:  [address] [place] [address]    Patient seen for dysphagia therapy. Patient was awake but very confused. SLP set up tray and fed patient. Limited intake. Patient accepted a couple bites of soft solids and sips of thin liquid per straw. No s/s of aspiration noted. Pt declined additional PO.       CPT code(s) 44751  dysphagia tx  Total minutes :  15 minutes    Natalie M D'Amico M. A. Kermit Slain [id number]  Speech Language Pathologist

## 2021-09-23 NOTE — CARE COORDINATION
CM note: [facility] to meet with daughter today at [facility]

## 2021-09-23 NOTE — PROGRESS NOTES
[address] [facility]  [facility]  Progress Note    NAME: Kelly Son  MR:  [id number]  :   1926  DATE OF SERVICE:21    This is a face to face encounter with Kelly Son 80 y.o. female on 21    CHIEF COMPLAINT     ID following for   Chief Complaint   Patient presents with    Fall     pt fell from a standing position. pt has no complaints of pain and does not remember the fall due to her dementia     HISTORY OF PRESENT ILLNESS   Pt seen and examined  21  PT IS AWAKE  NAD  TALKING FLYNN   Parainfluenza Virus 3 +  Patient is tolerating medications. No reported adverse drug reactions. REVIEW OF SYSTEMS     As stated above in the chief complaint, otherwise negative. CURRENT MEDICATIONS      losartan  50 mg Oral Daily    piperacillin-tazobactam  3,375 mg IntraVENous Q8H    polyethylene glycol  17 g Oral BID    sodium chloride flush  5-40 mL IntraVENous 2 times per day    bisacodyl  10 mg Rectal Daily    [Held by provider] enoxaparin  40 mg SubCUTAneous Daily    [Held by provider] clopidogrel  75 mg Oral Every Other Day    escitalopram  20 mg Oral Daily    haloperidol  0.5 mg Oral Daily    cetirizine  10 mg Oral Daily    melatonin  5 mg Oral Nightly    pantoprazole  40 mg Oral Daily     Continuous Infusions:   sodium chloride Stopped (21 0414)    dextrose 5 % and 0.45 % NaCl 100 mL/hr at 21 1530    sodium chloride       PRN Meds:acetaminophen, Soothe XP Xtra Protection, acetaminophen, ondansetron **OR** ondansetron, sodium chloride flush, sodium chloride, sodium phosphate, morphine **OR** morphine    PHYSICAL EXAM     /60   Pulse 107   Temp 98 °F (36.7 °C) (Oral)   Resp 18   Ht 5' 6\" (1.676 m)   Wt 146 lb (66.2 kg)   SpO2 97%   BMI 23.57 kg/m²   Temp  Av.5 °F (36.9 °C)  Min: 98 °F (36.7 °C)  Max: 98.9 °F (37.2 °C)  Constitutional:  The patient is awake, alert, and oriented. Skin:    Warm and dry.  No rashes were noted.   HEENT:   Round and reactive pupils. AT/NC  Neck:    Supple to movements. Chest:   No use of accessory muscles to breathe. Symmetrical expansion. ra  Cardiovascular:  S1 and S2 are rhythmic and regular. No murmurs appreciated. Abdomen:   Positive bowel sounds to auscultation. Benign to palpation. Extremities:   No clubbing, no cyanosis, no edema. CNS    AAxO   Lines: pIV      DIAGNOSTIC RESULTS   Radiology:    Recent Labs     09/21/21  0558 09/22/21  0349 09/23/21  0358   WBC 14.0* 13.7* 11.8*   RBC 3.65 3.62 3.07*   HGB 11.8 11.7 10.0*   HCT 34.1 34.6 28.5*   MCV 93.4 95.6 92.8   MCH 32.3 32.3 32.6   MCHC 34.6* 33.8 35.1*   RDW 12.6 12.9 12.4    304 280   MPV 9.0 9.1 9.0     Recent Labs     09/21/21  0558 09/22/21  0349 09/23/21  0358    135 132   K 4.4 3.9 3.3*    100 99   CO2 25 25 23   BUN 16 19 13   CREATININE 0.6 0.6 0.5   GLUCOSE 138* 168* 140*   CALCIUM 9.0 8.6 8.3*     Lab Results   Component Value Date    CRP 14.9 (H) 09/22/2021     Lab Results   Component Value Date    SEDRATE 31 (H) 09/22/2021    SEDRATE 11 04/05/2013    SEDRATE 12 02/17/2013     Recent Labs     09/22/21  1448   CRP 14.9*   PROCAL 0.12*     Lab Results   Component Value Date    CHOL 209 10/11/2011    TRIG 106 10/11/2011    HDL 81.0 10/11/2011    LDLCALC 107 10/11/2011     No results found for: VITD25    Microbiology:   Recent Labs     09/22/21  1440   COVID19 Not Detected     Lab Results   Component Value Date    BC 24 Hours no growth 09/21/2021    BC 5 Days no growth 01/29/2021    BLOODCULT2 24 Hours no growth 09/21/2021    BLOODCULT2 5 Days no growth 01/29/2021        FINAL IMPRESSION    Pt had   Chief Complaint   Patient presents with    Fall     pt fell from a standing position.   pt has no complaints of pain and does not remember the fall due to her dementia    Admitted for Pubic ramus fracture, right, closed, initial encounter [facility]) [S32.031F]  Closed fracture of transverse process of lumbar vertebra, initial encounter ([facility] [S32.009A]  Closed fracture of ramus of right pubis, initial encounter ([facility] [S32.591A]  Closed fracture of sacrum, unspecified portion of sacrum, initial encounter ([facility] [S32.10XA]  On treatment for   FEVERS BETTER  LEUKOCYTOSIS  HEMATURIA   S/P FALL WITH FX   Parainfluenza Virus 3      piperacillin-tazobactam (ZOSYN) 3,375 mg in dextrose 5 % 50 mL IVPB extended infusion (mini-bag), Q8   [id number]    · Monitor labs    Imaging and labs were reviewed per medical records. The patient was educated about the diagnosis, prognosis, indications, risks and benefits of treatment. An opportunity to ask questions was given to the patient/FAMILY. Thank you for involving me in the care of Mark Momin will continue to follow. Please do not hesitate to call for any questions or concerns.     Electronically signed by Rhina Fontenot MD on 9/23/2021 at 10:22 AM

## 2021-09-23 NOTE — CARE COORDINATION
CM note: [address] [facility] liaison here and met with family. Daughter has chosen for patient to go to [facility] under Hospice care. Confirmed with [facility] liaison, [address] [place] [address], and they are expecting patient today. CM will arrange ambulance transportation with [facility] once discharge written. ADDENDUM;2:15 PM  Dr Grover Mendes aware that patient will be going under Hospice and will be coming in to discharge. [facility] arranged for 7PM stretcher transport. Pts daughter, Hospice nurse and [facility] liaison aware of arrangements.   Electronically signed by Therese Byers RN on 9/23/2021 at 2:15 PM

## 2021-09-23 NOTE — PROGRESS NOTES
Progress Note  Date:2021       Room:0321/0321-02  Patient Sunitha Coats     YOB: 1926     Age:89 y.o. Subjective    Subjective:  Symptoms:  Improved. She reports malaise and weakness. (Awake alert, confused, pleasant). Diet:  Adequate intake. No nausea or vomiting. Activity level: Impaired due to weakness. Pain:  She reports no pain. Review of Systems   Constitutional: Negative for fever. Gastrointestinal: Negative for nausea and vomiting. Neurological: Positive for weakness. All other systems reviewed and are negative. Objective         Vitals Last 24 Hours:  TEMPERATURE:  Temp  Av.5 °F (36.9 °C)  Min: 98 °F (36.7 °C)  Max: 98.9 °F (37.2 °C)  RESPIRATIONS RANGE: Resp  Av  Min: 16  Max: 18  PULSE OXIMETRY RANGE: SpO2  Av %  Min: 97 %  Max: 97 %  PULSE RANGE: Pulse  Av  Min: 87  Max: 107  BLOOD PRESSURE RANGE: Systolic (68BPB), MPK:736 , Min:115 , HYM:563   ; Diastolic (97CPP), ICJ:99, Min:60, Max:90    I/O (24Hr): Intake/Output Summary (Last 24 hours) at 2021 1508  Last data filed at 2021 1326  Gross per 24 hour   Intake 200 ml   Output 600 ml   Net -400 ml     Objective:  General Appearance:  Ill-appearing. Vital signs: (most recent): Blood pressure 115/60, pulse 107, temperature 98 °F (36.7 °C), temperature source Oral, resp. rate 18, height 5' 6\" (1.676 m), weight 121 lb (54.9 kg), SpO2 97 %. Vital signs are normal.  No fever. Output: Producing urine and producing stool. HEENT: Normal HEENT exam.    Lungs:  Normal effort and normal respiratory rate. Heart: Normal rate. Regular rhythm. Abdomen: Abdomen is soft. Bowel sounds are normal.   There is no abdominal tenderness. Extremities: Decreased range of motion. Neurological: Patient is alert. Skin:  Warm and dry.       Labs/Imaging/Diagnostics    Labs:  CBC:  Recent Labs     21  0558 21  0349 21  0358   WBC 14.0* 13.7* 11.8* RBC 3.65 3.62 3.07*   HGB 11.8 11.7 10.0*   HCT 34.1 34.6 28.5*   MCV 93.4 95.6 92.8   RDW 12.6 12.9 12.4    304 280     CHEMISTRIES:  Recent Labs     09/21/21  0558 09/22/21  0349 09/23/21  0358    135 132   K 4.4 3.9 3.3*    100 99   CO2 25 25 23   BUN 16 19 13   CREATININE 0.6 0.6 0.5   GLUCOSE 138* 168* 140*     PT/INR:No results for input(s): PROTIME, INR in the last 72 hours. APTT:No results for input(s): APTT in the last 72 hours. LIVER PROFILE:No results for input(s): AST, ALT, BILIDIR, BILITOT, ALKPHOS in the last 72 hours. Imaging Last 24 Hours:  XR CHEST PORTABLE    Result Date: 9/21/2021  EXAMINATION: ONE XRAY VIEW OF THE CHEST 9/21/2021 4:12 pm COMPARISON: January 29, 2021 HISTORY: ORDERING SYSTEM PROVIDED HISTORY: sob TECHNOLOGIST PROVIDED HISTORY: Reason for exam:->sob FINDINGS: Lungs are clear, no infiltrates, consolidates or pleural effusions. The heart is normal size. There is mild ectasia of the aorta. There is no perihilar vascular congestion. No acute cardiopulmonary process. Assessment//Plan           Hospital Problems         Last Modified POA    * (Principal) Altered mental status 9/22/2021 Yes    Vasovagal near syncope 9/22/2021 Yes    Pubic ramus fracture, right, closed, initial encounter ([facility] 9/16/2021 Yes    Severe protein-calorie malnutrition ([facility] 9/23/2021 Yes        Assessment:    Condition: In stable condition. Improving. (Pelvic fracture  Proteus UTI  Debility  Advanced Dementia). Plan:   Regular diet. (Will discontinue love  Continue po antibiotics  Discharge to [facility]).        Electronically signed by Mikie Richard DO on 9/23/21 at 3:08 PM EDT

## 2021-09-24 NOTE — PROGRESS NOTES
Palliative Medicine   Progression of Care     Patient's chart reviewed. Patient will be going under Hospice Care. [facility] was the agency selected by the family. [facility] arranged for 7PM stretcher transport. Pts daughter, Hospice nurse and [facility] liaison aware of arrangements. NO needs for Palliative Care. Thank you for including Palliative in the care of [facility].        Palliative Care was following closely for support of patient and family   [facility] APRN-CNP, AGACNP-BC

## 2021-09-25 LAB
(1,3)-BETA-D-GLUCAN (FUNGITELL) INTERPRETATION: NEGATIVE
(1,3)-BETA-D-GLUCAN (FUNGITELL): <31 PG/ML

## 2021-09-26 LAB
BLOOD CULTURE, ROUTINE: NORMAL
CULTURE, BLOOD 2: NORMAL

## 2021-10-12 NOTE — DISCHARGE SUMMARY
Discharge Summary    Date: 10/11/2021  Patient Name: Warren Wells YOB: 1926 Age: 80 y.o. Admit Date: 9/16/2021  Discharge Date: 9/23/2021  Discharge Condition: Stable    Admission Diagnosis  Pubic ramus fracture, right, closed, initial encounter ([facility] (S32.591A); Closed fracture of transverse process of lumbar vertebra, initial encounter ([facility] (S32.009A); Closed fracture of ramus of right pubis, initial encounter ([facility] (S32.591A); Closed fracture of sacrum, unspecified portion of sacrum, initial encounter ([facility] (S32.10XA)     Discharge Diagnosis  Principal Problem: Altered mental statusActive Problems:  Vasovagal near syncope  Pubic ramus fracture, right, closed, initial encounter ([facility]  Severe protein-calorie malnutrition (HCC)Resolved Problems:  * No resolved hospital problems. [facility] Stay  Narrative of Hospital Course:  Admitted for pelvic fracture, developed UTI POA,  Responded well to therapy, IVF, and IV antibiotics. Family ended up making patient hospice prior to discharge to nursing home for rehab. Consultants:  IP CONSULT TO FAMILY MEDICINEIP CONSULT TO GENERAL SURGERYIP CONSULT TO UROLOGYIP CONSULT TO ORTHOPEDIC SURGERYIP CONSULT TO NEUROLOGYIP CONSULT TO INFECTIOUS DISEASESIP CONSULT TO PALLIATIVE CAREIP CONSULT TO HOSPICE    Surgeries/procedures Performed:  None     Treatments:    Analgesia, Antibiotics and Anticoagulation    Vicodin, Ceftriaxone, LMW Heparin    Discharge Plan/Disposition:  To [facility]    Hospital/Incidental Findings Requiring Follow Up:    Patient Instructions:    Diet: Regular Diet    Activity:Activity as Tolerated  For number of days (if applicable): Other Instructions:    Provider Follow-Up:   No follow-ups on file. Significant Diagnostic Studies:    Recent Labs:  Admission on 09/16/2021, Discharged on 09/23/2021No results displayed because visit has over 200 results. ------------    Radiology last 7 days:  No results found. [unfilled]    Discharge Medications    Discharge Medication List as of 9/23/2021  3:59 PMSTART taking these medicationslosartan (COZAAR) 50 MG tabletTake 1 tablet by mouth daily, Disp-30 tablet, R-3Normal    Discharge Medication List as of 9/23/2021  3:59 PM    Discharge Medication List as of 9/23/2021  3:59 PMCONTINUE these medications which have NOT CHANGEDacetaminophen (TYLENOL) 500 MG tabletTake 1,000 mg by mouth every 6 hours as needed for PainHistorical Medloratadine (CLARITIN) 10 MG tabletTake 10 mg by mouth daily as needed (Allergies)Historical Medescitalopram (LEXAPRO) 20 MG tabletTake 20 mg by mouth dailyHistorical Medsulfamethoxazole-trimethoprim (BACTRIM DS;SEPTRA DS) 800-160 MG per tabletTake 1 tablet by mouth 2 times dailyHistorical Meddiclofenac (VOLTAREN) 50 MG EC tabletTake 50 mg by mouth 3 times dailyHistorical Med    Discharge Medication List as of 9/23/2021  3:59 PMSTOP taking these medicationsmelatonin 5 MG TABS tabletComments:Reason for Stopping:pantoprazole (PROTONIX) 40 MG tabletComments:Reason for Stopping:trolamine salicylate (ASPERCREME/ALOE) 10 % creamComments:Reason for Stopping:ciprofloxacin (CIPRO) 250 MG tabletComments:Reason for Stopping:polyethylene glycol (GLYCOLAX) 17 GM/SCOOP powderComments:Reason for Stopping:haloperidol (HALDOL) 0.5 MG tabletComments:Reason for Stopping:carboxymethylcellulose PF (REFRESH PLUS) 0.5 % SOLN ophthalmic solutionComments:Reason for Stopping:clopidogrel (PLAVIX) 75 MG tabletComments:Reason for Stopping:Multiple Vitamins-Minerals (CENTRUM SILVER PO)Comments:Reason for Stopping:    Time Spent on Discharge:4E] minutes were spent in patient examination, evaluation, counseling as well as medication reconciliation, prescriptions for required medications, discharge plan, and follow up.     Electronically signed by Betty Og DO on 10/11/21 at 11:28 PM EDT
